# Patient Record
Sex: FEMALE | Race: WHITE | NOT HISPANIC OR LATINO | Employment: OTHER | URBAN - METROPOLITAN AREA
[De-identification: names, ages, dates, MRNs, and addresses within clinical notes are randomized per-mention and may not be internally consistent; named-entity substitution may affect disease eponyms.]

---

## 2017-01-04 ENCOUNTER — PATIENT MESSAGE (OUTPATIENT)
Dept: TRANSPLANT | Facility: CLINIC | Age: 72
End: 2017-01-04

## 2017-01-16 DIAGNOSIS — R19.7 DIARRHEA: ICD-10-CM

## 2017-01-17 RX ORDER — LOPERAMIDE HYDROCHLORIDE 2 MG/1
CAPSULE ORAL
Qty: 30 CAPSULE | Refills: 0 | Status: SHIPPED | OUTPATIENT
Start: 2017-01-17 | End: 2017-02-07 | Stop reason: SDUPTHER

## 2017-01-23 LAB
EXT ALBUMIN: 3.2
EXT ALKALINE PHOSPHATASE: 101
EXT ALT: 27
EXT AST: 25
EXT BILIRUBIN TOTAL: 0.6
EXT BUN: 13
EXT CALCIUM: 8.6
EXT CHLORIDE: 106
EXT CO2: 31
EXT CREATININE: 0.7 MG/DL
EXT CYCLOSPORONE LEVEL: 135
EXT EOSINOPHIL%: 3.6
EXT GFR MDRD NON AF AMER: 94
EXT GLUCOSE: 93
EXT HEMATOCRIT: 39.1
EXT HEMOGLOBIN: 12.3
EXT LYMPH%: 15.8
EXT MONOCYTES%: 7.8
EXT PLATELETS: 159
EXT POTASSIUM: 4.1
EXT PROTEIN TOTAL: 6.3
EXT SEGS%: 72.4
EXT SODIUM: 144 MMOL/L
EXT WBC: 10.2

## 2017-01-24 ENCOUNTER — TELEPHONE (OUTPATIENT)
Dept: TRANSPLANT | Facility: CLINIC | Age: 72
End: 2017-01-24

## 2017-01-24 NOTE — TELEPHONE ENCOUNTER
----- Message from Shala Hernandez MD sent at 1/24/2017  1:16 PM CST -----  The Labs are stable - please let patient know.

## 2017-02-06 PROBLEM — I81 PORTAL VEIN THROMBOSIS: Status: ACTIVE | Noted: 2017-02-06

## 2017-02-07 DIAGNOSIS — R19.7 DIARRHEA: ICD-10-CM

## 2017-02-08 RX ORDER — LOPERAMIDE HYDROCHLORIDE 2 MG/1
CAPSULE ORAL
Qty: 30 CAPSULE | Refills: 0 | Status: SHIPPED | OUTPATIENT
Start: 2017-02-08 | End: 2017-03-03 | Stop reason: SDUPTHER

## 2017-03-03 DIAGNOSIS — R19.7 DIARRHEA: ICD-10-CM

## 2017-03-03 RX ORDER — LOPERAMIDE HYDROCHLORIDE 2 MG/1
CAPSULE ORAL
Qty: 30 CAPSULE | Refills: 0 | Status: SHIPPED | OUTPATIENT
Start: 2017-03-03 | End: 2017-03-23 | Stop reason: SDUPTHER

## 2017-03-05 DIAGNOSIS — K21.9 GASTROESOPHAGEAL REFLUX DISEASE WITHOUT ESOPHAGITIS: ICD-10-CM

## 2017-03-05 RX ORDER — FAMOTIDINE 40 MG/1
TABLET, FILM COATED ORAL
Qty: 30 TABLET | Refills: 11 | Status: SHIPPED | OUTPATIENT
Start: 2017-03-05 | End: 2017-08-29 | Stop reason: SDUPTHER

## 2017-03-23 DIAGNOSIS — R19.7 DIARRHEA, UNSPECIFIED TYPE: ICD-10-CM

## 2017-03-23 RX ORDER — LOPERAMIDE HYDROCHLORIDE 2 MG/1
2 CAPSULE ORAL 4 TIMES DAILY PRN
Qty: 90 CAPSULE | Refills: 0 | Status: SHIPPED | OUTPATIENT
Start: 2017-03-23 | End: 2017-05-12 | Stop reason: SDUPTHER

## 2017-03-24 ENCOUNTER — TELEPHONE (OUTPATIENT)
Dept: TRANSPLANT | Facility: CLINIC | Age: 72
End: 2017-03-24

## 2017-03-24 NOTE — TELEPHONE ENCOUNTER
"Spoke to patient's  "Zachariah", he reported that Pharmacy called to say Rx. For Immodium was ready. Informed him that  gave more capsules.   "

## 2017-03-24 NOTE — TELEPHONE ENCOUNTER
----- Message from Cheryle Palmer sent at 3/23/2017  3:06 PM CDT -----  Contact: pt    Calling to speak with you about a prescription loperamide (IMODIUM) 2 mg to get an increase on the supply . Please call

## 2017-04-05 ENCOUNTER — TELEPHONE (OUTPATIENT)
Dept: TRANSPLANT | Facility: CLINIC | Age: 72
End: 2017-04-05

## 2017-04-05 DIAGNOSIS — Z94.0 KIDNEY REPLACED BY TRANSPLANT: ICD-10-CM

## 2017-04-05 DIAGNOSIS — Z94.4 LIVER REPLACED BY TRANSPLANT: Primary | ICD-10-CM

## 2017-04-05 DIAGNOSIS — E55.9 VITAMIN D DEFICIENCY: ICD-10-CM

## 2017-04-05 NOTE — TELEPHONE ENCOUNTER
Patient due for RTC appt. With Dr. Hernandez for 6 mo. F/u in May 2017 (per last clinic note).  Appointment cards for RTC and labs given to .

## 2017-05-12 DIAGNOSIS — R19.7 DIARRHEA, UNSPECIFIED TYPE: ICD-10-CM

## 2017-05-14 RX ORDER — LOPERAMIDE HYDROCHLORIDE 2 MG/1
2 CAPSULE ORAL 4 TIMES DAILY PRN
Qty: 90 CAPSULE | Refills: 0 | Status: SHIPPED | OUTPATIENT
Start: 2017-05-14

## 2017-05-29 ENCOUNTER — OFFICE VISIT (OUTPATIENT)
Dept: TRANSPLANT | Facility: CLINIC | Age: 72
End: 2017-05-29
Payer: MEDICARE

## 2017-05-29 ENCOUNTER — LAB VISIT (OUTPATIENT)
Dept: LAB | Facility: HOSPITAL | Age: 72
End: 2017-05-29
Attending: INTERNAL MEDICINE
Payer: MEDICARE

## 2017-05-29 VITALS
BODY MASS INDEX: 31.59 KG/M2 | HEART RATE: 74 BPM | OXYGEN SATURATION: 98 % | SYSTOLIC BLOOD PRESSURE: 142 MMHG | HEIGHT: 65 IN | DIASTOLIC BLOOD PRESSURE: 64 MMHG | TEMPERATURE: 88 F | RESPIRATION RATE: 16 BRPM | WEIGHT: 189.63 LBS

## 2017-05-29 DIAGNOSIS — E55.9 VITAMIN D DEFICIENCY: ICD-10-CM

## 2017-05-29 DIAGNOSIS — D84.9 IMMUNOSUPPRESSED STATUS: ICD-10-CM

## 2017-05-29 DIAGNOSIS — Z94.4 S/P LIVER TRANSPLANT: Chronic | ICD-10-CM

## 2017-05-29 DIAGNOSIS — Z94.0 KIDNEY REPLACED BY TRANSPLANT: ICD-10-CM

## 2017-05-29 DIAGNOSIS — Z29.89 NEED FOR PROPHYLACTIC IMMUNOTHERAPY: Primary | ICD-10-CM

## 2017-05-29 DIAGNOSIS — Z94.4 LIVER REPLACED BY TRANSPLANT: ICD-10-CM

## 2017-05-29 LAB
25(OH)D3+25(OH)D2 SERPL-MCNC: 32 NG/ML
ALBUMIN SERPL BCP-MCNC: 3.2 G/DL
ALP SERPL-CCNC: 114 U/L
ALT SERPL W/O P-5'-P-CCNC: 16 U/L
ANION GAP SERPL CALC-SCNC: 10 MMOL/L
AST SERPL-CCNC: 22 U/L
BASOPHILS # BLD AUTO: 0.01 K/UL
BASOPHILS NFR BLD: 0.1 %
BILIRUB SERPL-MCNC: 0.4 MG/DL
BUN SERPL-MCNC: 19 MG/DL
CALCIUM SERPL-MCNC: 9.3 MG/DL
CHLORIDE SERPL-SCNC: 106 MMOL/L
CO2 SERPL-SCNC: 27 MMOL/L
CREAT SERPL-MCNC: 0.8 MG/DL
CYCLOSPORINE BLD LC/MS/MS-MCNC: 138 NG/ML
DIFFERENTIAL METHOD: ABNORMAL
EOSINOPHIL # BLD AUTO: 0.5 K/UL
EOSINOPHIL NFR BLD: 4.3 %
ERYTHROCYTE [DISTWIDTH] IN BLOOD BY AUTOMATED COUNT: 14.3 %
EST. GFR  (AFRICAN AMERICAN): >60 ML/MIN/1.73 M^2
EST. GFR  (NON AFRICAN AMERICAN): >60 ML/MIN/1.73 M^2
GLUCOSE SERPL-MCNC: 113 MG/DL
HCT VFR BLD AUTO: 41 %
HGB BLD-MCNC: 13.1 G/DL
LYMPHOCYTES # BLD AUTO: 1.9 K/UL
LYMPHOCYTES NFR BLD: 15.7 %
MAGNESIUM SERPL-MCNC: 1.5 MG/DL
MCH RBC QN AUTO: 27.1 PG
MCHC RBC AUTO-ENTMCNC: 32 %
MCV RBC AUTO: 85 FL
MONOCYTES # BLD AUTO: 1 K/UL
MONOCYTES NFR BLD: 8.1 %
NEUTROPHILS # BLD AUTO: 8.7 K/UL
NEUTROPHILS NFR BLD: 71.1 %
PHOSPHATE SERPL-MCNC: 3.7 MG/DL
PLATELET # BLD AUTO: 201 K/UL
PMV BLD AUTO: 12.4 FL
POTASSIUM SERPL-SCNC: 4.6 MMOL/L
PROT SERPL-MCNC: 6.4 G/DL
PTH-INTACT SERPL-MCNC: 142 PG/ML
RBC # BLD AUTO: 4.83 M/UL
SODIUM SERPL-SCNC: 143 MMOL/L
WBC # BLD AUTO: 12.18 K/UL

## 2017-05-29 PROCEDURE — 99999 PR PBB SHADOW E&M-EST. PATIENT-LVL IV: CPT | Mod: PBBFAC,,, | Performed by: INTERNAL MEDICINE

## 2017-05-29 PROCEDURE — 99214 OFFICE O/P EST MOD 30 MIN: CPT | Mod: PBBFAC | Performed by: INTERNAL MEDICINE

## 2017-05-29 PROCEDURE — 99215 OFFICE O/P EST HI 40 MIN: CPT | Mod: S$PBB,,, | Performed by: INTERNAL MEDICINE

## 2017-05-29 RX ORDER — MONTELUKAST SODIUM 4 MG/1
1 TABLET, CHEWABLE ORAL DAILY
Refills: 1 | COMMUNITY
Start: 2017-04-18

## 2017-05-29 NOTE — LETTER
June 1, 2017        Julian Santo  [address] [facility] [address]  Phone: [phone]  Fax: [phone]             [facility] - Liver Transplant  [address]  [facility] [address]  Phone: [phone]   Patient: Lillian Stout   MR Number: [id number]   YOB: 1945   Date of Visit: 5/29/2017       Dear Dr. Julian Santo    Thank you for referring Lillian Stout to me for evaluation. Attached you will find relevant portions of my assessment and plan of care.    If you have questions, please do not hesitate to call me. I look forward to following Lillian Stout along with you.    Sincerely,    Shala Hernandez MD    Enclosure    If you would like to receive this communication electronically, please contact [email or web address] or [phone] to request [facility] Link access.    [facility] is a tool which provides read-only access to select patient information with whom you have a relationship. Its easy to use and provides real time access to review your patients record including encounter summaries, notes, results, and demographic information.    If you feel you have received this communication in error or would no longer like to receive these types of communications, please e-mail [email or web address]

## 2017-05-29 NOTE — PROGRESS NOTES
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    ORGAN: LIVER  Whole or Partial: whole liver  Donor Type:  - brain death  [facility] High Risk: no  Donor CMV Status: positive  Donor HCV Status: negative  Donor HBcAb: negative  Biliary Anastomosis: end to end  Arterial Anatomy: standard  IVC reconstruction: end to end ivc  Portal vein status: completely thrombosed    She had a long complicted course post liver-kidney transplant.     Subjective:     Interval History: Lillian is now 30 months post liver-kidney transplant. She is overall doing well.    She is still on CSA and prednisone (not cellcept). Liver enzymes are normal. She had biliary stents removed in  and they were not replaced. Her last u/s with doppler was in Oct/15 and was normal. Liver tests are low. She had her IVC filter removed on 2/3/16.    She is c/o fatigue. I note she is on metoprolol     She saw a dermatologist in Aug/16.  She had a normal mammogram ni  and normal pap test at that time.Bone density was done and showed osteoporosis. She has a Rx for alendronate.    Review of Systems   Constitutional: Negative.    HENT: Negative.    Eyes: Negative.    Respiratory: Negative.    Cardiovascular: Negative.    Gastrointestinal: Negative.    Genitourinary: Negative.    Musculoskeletal:        Lower Leg pain   Skin: Negative.    Neurological: Negative.    Psychiatric/Behavioral: Negative.        Objective:     Physical Exam   Constitutional: She is oriented to person, place, and time. She appears well-developed and well-nourished.   HENT:   Head: Normocephalic and atraumatic.   Eyes: Conjunctivae and EOM are normal. Pupils are equal, round, and reactive to light. No scleral icterus.   Neck: Normal range of motion. Neck supple. No thyromegaly present.    Cardiovascular: Normal rate, regular rhythm and normal heart sounds.    Pulmonary/Chest: Effort normal and breath sounds normal. She has no rales.   Abdominal: Soft. Bowel sounds are normal. She exhibits no distension and no mass. There is no tenderness.   Musculoskeletal: Normal range of motion. She exhibits no edema.   Neurological: She is alert and oriented to person, place, and time.   Skin: Skin is warm and dry. No rash noted.   Psychiatric: She has a normal mood and affect.   Vitals reviewed.    Lab Results   Component Value Date    BILITOT 0.4 01/25/2016    AST 14 01/25/2016    ALT 15 01/25/2016    ALKPHOS 184 (H) 01/25/2016    CREATININE 0.8 01/25/2016    ALBUMIN 3.2 (L) 01/25/2016     Lab Results   Component Value Date    WBC 12.18 05/29/2017    HGB 13.1 05/29/2017    HCT 41.0 05/29/2017    HCT 22 (L) 12/09/2014     05/29/2017     Lab Results   Component Value Date    TACROLIMUS <1.5 (L) 07/25/2015    CYCLOSPORINE 124 01/25/2016       Assessment/Plan:     The patient is now 30 months s/p liver- kidney transplant and has excellent allograft function. Current recommendations:  1. Complication of liver tx; biliary stricture: stents out: monitor for recurrence  2. S/p liver-kidney tx and control of IS: continue csa and prednisone; continue to hold cellcept (target trough )  3. DM, continue insulin  4. Malnutrition, improved: monitor  5. Zoster: no recurrence; monitor  6. Fatigue: consider change to alternate antihypertensive agent; metoprolol may be causing fatigue; consider sleep study; check tsh  7. Diarrhea with incontinence. Continue metamucil and imodium.  9. Lasix: edema, ongoing: continue lasix  10. Na chloride: continue  11.  Health maintenance: the patient should see a dermatologist annually to screen for skin cancer, perform regular colonoscopies, pap tests and mammograms  12. R/O osteoporosis.I agree with alendronate; repeat bone density 2 years  Return at 3 years post liver  tx.    Shala Hernandez MD           [facility] Patient Status  Functional Status: 50% - Requires considerable assistance and frequent medical care  Physical Capacity: Limited Mobility  . . . . .

## 2017-05-29 NOTE — PATIENT INSTRUCTIONS
1. metoprolol may be causing fatigue- ask PCP to change it  2. Check TSH and lipids with PCP  3. Consider sleep study for fatigue  Return at 3 years post liver transplant

## 2017-06-06 NOTE — PROGRESS NOTES
Labs and imaging studies were reviewed with the following changes  No change at the moment  Will defer any further follow up with the patient's General nephrologist. Let us know if there is any change with GFR  thanks

## 2017-06-07 ENCOUNTER — TELEPHONE (OUTPATIENT)
Dept: TRANSPLANT | Facility: CLINIC | Age: 72
End: 2017-06-07

## 2017-06-07 NOTE — TELEPHONE ENCOUNTER
----- Message from Rick Montalvo MD sent at 6/6/2017 11:29 AM CDT -----  Labs and imaging studies were reviewed with the following changes  No change at the moment  Will defer any further follow up with the patient's General nephrologist. Let us know if there is any change with GFR  thanks

## 2017-06-08 LAB
BK VIRUS DNA PCR, QUANT, BLOOD: NORMAL
BK VIRUS DNA, BLOOD: NORMAL
LOG BKV COPIES/ML: NORMAL

## 2017-07-08 ENCOUNTER — PATIENT MESSAGE (OUTPATIENT)
Dept: TRANSPLANT | Facility: CLINIC | Age: 72
End: 2017-07-08

## 2017-08-04 ENCOUNTER — TELEPHONE (OUTPATIENT)
Dept: TRANSPLANT | Facility: CLINIC | Age: 72
End: 2017-08-04

## 2017-08-04 NOTE — TELEPHONE ENCOUNTER
Spoke to nurse at [facility] ('s office) re: lab results of 7/31/17. Informed that patient did not get labs drawn that date.  Last labs in June 2017.  Missed Lab Letter was sent to patient.

## 2017-08-10 ENCOUNTER — TELEPHONE (OUTPATIENT)
Dept: TRANSPLANT | Facility: CLINIC | Age: 72
End: 2017-08-10

## 2017-08-10 NOTE — TELEPHONE ENCOUNTER
Received call from patient's : she will start having her labs drawn at [facility] in [place].  She will go on Monday 8/14/17.    Standing Lab Order faxed to lab at [facility] (fax # [phone]).

## 2017-08-14 LAB
EXT ALBUMIN: 4
EXT ALKALINE PHOSPHATASE: 92
EXT ALT: 21
EXT AST: 32
EXT BILIRUBIN TOTAL: 0.4
EXT BUN: 23
EXT CALCIUM: 9.9
EXT CHLORIDE: 104
EXT CO2: 28
EXT CREATININE: 0.9 MG/DL
EXT CYCLOSPORONE LEVEL: 158
EXT EOSINOPHIL%: 6.1
EXT GLUCOSE: 88
EXT HEMATOCRIT: 40.1
EXT HEMOGLOBIN: 12.4
EXT LYMPH%: 11.7
EXT MONOCYTES%: 7.8
EXT PLATELETS: 172
EXT POTASSIUM: 5.2
EXT PROTEIN TOTAL: 6.2
EXT SEGS%: 73.9
EXT SODIUM: 146 MMOL/L
EXT WBC: 10.8

## 2017-08-18 ENCOUNTER — TELEPHONE (OUTPATIENT)
Dept: TRANSPLANT | Facility: CLINIC | Age: 72
End: 2017-08-18

## 2017-08-18 DIAGNOSIS — R60.9 EDEMA, UNSPECIFIED TYPE: ICD-10-CM

## 2017-08-18 RX ORDER — FUROSEMIDE 20 MG/1
20 TABLET ORAL DAILY
Qty: 30 TABLET | Refills: 11 | Status: SHIPPED | OUTPATIENT
Start: 2017-08-18 | End: 2017-08-29 | Stop reason: SDUPTHER

## 2017-08-21 DIAGNOSIS — Z94.4 STATUS POST LIVER TRANSPLANT: ICD-10-CM

## 2017-08-21 RX ORDER — PREDNISONE 5 MG/1
5 TABLET ORAL DAILY
Qty: 30 TABLET | Refills: 11 | Status: SHIPPED | OUTPATIENT
Start: 2017-08-21 | End: 2017-08-29 | Stop reason: SDUPTHER

## 2017-08-29 DIAGNOSIS — K21.9 GASTROESOPHAGEAL REFLUX DISEASE WITHOUT ESOPHAGITIS: ICD-10-CM

## 2017-08-29 DIAGNOSIS — Z94.4 STATUS POST LIVER TRANSPLANT: ICD-10-CM

## 2017-08-29 DIAGNOSIS — R60.9 EDEMA, UNSPECIFIED TYPE: ICD-10-CM

## 2017-08-29 RX ORDER — FUROSEMIDE 20 MG/1
20 TABLET ORAL DAILY
Qty: 90 TABLET | Refills: 3 | Status: SHIPPED | OUTPATIENT
Start: 2017-08-29 | End: 2018-10-21 | Stop reason: SDUPTHER

## 2017-08-29 RX ORDER — PREDNISONE 5 MG/1
5 TABLET ORAL DAILY
Qty: 90 TABLET | Refills: 3 | Status: SHIPPED | OUTPATIENT
Start: 2017-08-29 | End: 2018-10-21 | Stop reason: SDUPTHER

## 2017-08-29 RX ORDER — FAMOTIDINE 40 MG/1
40 TABLET, FILM COATED ORAL DAILY
Qty: 90 TABLET | Refills: 3 | Status: SHIPPED | OUTPATIENT
Start: 2017-08-29 | End: 2018-11-17 | Stop reason: SDUPTHER

## 2017-10-06 ENCOUNTER — TELEPHONE (OUTPATIENT)
Dept: TRANSPLANT | Facility: CLINIC | Age: 72
End: 2017-10-06

## 2017-10-06 NOTE — TELEPHONE ENCOUNTER
----- Message from Cheryle Palmer sent at 10/6/2017  1:51 PM CDT -----  Contact: patient   Patient calling to see if the patient needs labs before her appt.       Please call       Thanks!!

## 2017-10-06 NOTE — TELEPHONE ENCOUNTER
Returned call to pt's . He would like to get labs the same day as her appt with Dr. Hernandez. Card submitted.

## 2017-10-09 DIAGNOSIS — Z94.4 LIVER TRANSPLANTED: Primary | ICD-10-CM

## 2017-10-10 DIAGNOSIS — Z94.4 STATUS POST LIVER TRANSPLANT: ICD-10-CM

## 2017-10-10 RX ORDER — CYCLOSPORINE 25 MG/1
CAPSULE, LIQUID FILLED ORAL
Qty: 90 CAPSULE | Refills: 3 | Status: SHIPPED | OUTPATIENT
Start: 2017-10-10 | End: 2018-11-01 | Stop reason: SDUPTHER

## 2017-10-10 RX ORDER — CYCLOSPORINE 100 MG/1
CAPSULE, LIQUID FILLED ORAL
Qty: 180 CAPSULE | Refills: 3 | Status: SHIPPED | OUTPATIENT
Start: 2017-10-10 | End: 2018-11-01 | Stop reason: SDUPTHER

## 2017-10-11 ENCOUNTER — PATIENT MESSAGE (OUTPATIENT)
Dept: TRANSPLANT | Facility: CLINIC | Age: 72
End: 2017-10-11

## 2017-10-11 NOTE — TELEPHONE ENCOUNTER
Called [facility] and prescriptions for both cyclosporines are being processed.

## 2017-11-13 ENCOUNTER — LAB VISIT (OUTPATIENT)
Dept: LAB | Facility: HOSPITAL | Age: 72
End: 2017-11-13
Attending: INTERNAL MEDICINE
Payer: MEDICARE

## 2017-11-13 ENCOUNTER — OFFICE VISIT (OUTPATIENT)
Dept: TRANSPLANT | Facility: CLINIC | Age: 72
End: 2017-11-13
Payer: MEDICARE

## 2017-11-13 VITALS
WEIGHT: 198.44 LBS | BODY MASS INDEX: 33.06 KG/M2 | OXYGEN SATURATION: 99 % | RESPIRATION RATE: 18 BRPM | SYSTOLIC BLOOD PRESSURE: 160 MMHG | HEIGHT: 65 IN | TEMPERATURE: 98 F | DIASTOLIC BLOOD PRESSURE: 80 MMHG | HEART RATE: 84 BPM

## 2017-11-13 DIAGNOSIS — Z94.4 LIVER TRANSPLANTED: ICD-10-CM

## 2017-11-13 DIAGNOSIS — Z29.89 NEED FOR PROPHYLACTIC IMMUNOTHERAPY: ICD-10-CM

## 2017-11-13 DIAGNOSIS — Z94.0 S/P KIDNEY TRANSPLANT: Chronic | ICD-10-CM

## 2017-11-13 DIAGNOSIS — M81.0 AGE-RELATED OSTEOPOROSIS WITHOUT CURRENT PATHOLOGICAL FRACTURE: Primary | ICD-10-CM

## 2017-11-13 DIAGNOSIS — Z94.4 S/P LIVER TRANSPLANT: Chronic | ICD-10-CM

## 2017-11-13 LAB
ALBUMIN SERPL BCP-MCNC: 3.1 G/DL
ALP SERPL-CCNC: 114 U/L
ALT SERPL W/O P-5'-P-CCNC: 21 U/L
ANION GAP SERPL CALC-SCNC: 11 MMOL/L
AST SERPL-CCNC: 28 U/L
BASOPHILS # BLD AUTO: 0.05 K/UL
BASOPHILS NFR BLD: 0.3 %
BILIRUB SERPL-MCNC: 0.6 MG/DL
BUN SERPL-MCNC: 22 MG/DL
CALCIUM SERPL-MCNC: 9.9 MG/DL
CHLORIDE SERPL-SCNC: 104 MMOL/L
CO2 SERPL-SCNC: 27 MMOL/L
CREAT SERPL-MCNC: 0.9 MG/DL
CYCLOSPORINE BLD LC/MS/MS-MCNC: 337 NG/ML
DIFFERENTIAL METHOD: ABNORMAL
EOSINOPHIL # BLD AUTO: 0.5 K/UL
EOSINOPHIL NFR BLD: 3.1 %
ERYTHROCYTE [DISTWIDTH] IN BLOOD BY AUTOMATED COUNT: 14.4 %
EST. GFR  (AFRICAN AMERICAN): >60 ML/MIN/1.73 M^2
EST. GFR  (NON AFRICAN AMERICAN): >60 ML/MIN/1.73 M^2
GLUCOSE SERPL-MCNC: 137 MG/DL
HCT VFR BLD AUTO: 41.1 %
HGB BLD-MCNC: 13.1 G/DL
IMM GRANULOCYTES # BLD AUTO: 0.09 K/UL
IMM GRANULOCYTES NFR BLD AUTO: 0.6 %
LYMPHOCYTES # BLD AUTO: 2.7 K/UL
LYMPHOCYTES NFR BLD: 17.9 %
MAGNESIUM SERPL-MCNC: 1.5 MG/DL
MCH RBC QN AUTO: 27.1 PG
MCHC RBC AUTO-ENTMCNC: 31.9 G/DL
MCV RBC AUTO: 85 FL
MONOCYTES # BLD AUTO: 0.8 K/UL
MONOCYTES NFR BLD: 5.3 %
NEUTROPHILS # BLD AUTO: 10.9 K/UL
NEUTROPHILS NFR BLD: 72.8 %
NRBC BLD-RTO: 0 /100 WBC
PHOSPHATE SERPL-MCNC: 3.4 MG/DL
PLATELET # BLD AUTO: 176 K/UL
PMV BLD AUTO: 14.2 FL
POTASSIUM SERPL-SCNC: 4.3 MMOL/L
PROT SERPL-MCNC: 6.9 G/DL
RBC # BLD AUTO: 4.83 M/UL
SODIUM SERPL-SCNC: 142 MMOL/L
WBC # BLD AUTO: 14.98 K/UL

## 2017-11-13 PROCEDURE — 99215 OFFICE O/P EST HI 40 MIN: CPT | Mod: S$PBB,,, | Performed by: INTERNAL MEDICINE

## 2017-11-13 PROCEDURE — 36415 COLL VENOUS BLD VENIPUNCTURE: CPT

## 2017-11-13 PROCEDURE — 99999 PR PBB SHADOW E&M-EST. PATIENT-LVL IV: CPT | Mod: PBBFAC,,, | Performed by: INTERNAL MEDICINE

## 2017-11-13 PROCEDURE — 85025 COMPLETE CBC W/AUTO DIFF WBC: CPT

## 2017-11-13 PROCEDURE — 99214 OFFICE O/P EST MOD 30 MIN: CPT | Mod: PBBFAC | Performed by: INTERNAL MEDICINE

## 2017-11-13 PROCEDURE — 84100 ASSAY OF PHOSPHORUS: CPT

## 2017-11-13 PROCEDURE — 83735 ASSAY OF MAGNESIUM: CPT

## 2017-11-13 PROCEDURE — 80053 COMPREHEN METABOLIC PANEL: CPT

## 2017-11-13 PROCEDURE — 80158 DRUG ASSAY CYCLOSPORINE: CPT

## 2017-11-13 RX ORDER — NAPROXEN SODIUM 220 MG/1
81 TABLET, FILM COATED ORAL DAILY
COMMUNITY

## 2017-11-13 NOTE — PROGRESS NOTES
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    ORGAN: LIVER  Whole or Partial: whole liver  Donor Type:  - brain death  [facility] High Risk: no  Donor CMV Status: positive  Donor HCV Status: negative  Donor HBcAb: negative  Biliary Anastomosis: end to end  Arterial Anatomy: standard  IVC reconstruction: end to end ivc  Portal vein status: completely thrombosed    She had a long complicted course post liver-kidney transplant.     Subjective:     Interval History: Lillian is now almost 3 years post liver-kidney transplant. She is overall doing well but has become weaker.    She is still on CSA and prednisone (not cellcept). Liver enzymes are normal. She had biliary stents removed in  and they were not replaced. Her last u/s with doppler was in Oct/15 and was normal. Liver tests are low. She had her IVC filter removed on 2/3/16.    She is c/o fatigue and is due to have a sleep study in the near future.    She sees a dermatologist annually.  She also has regular mammograms and pap tests. Bone density was done in 2016 and showed osteoporosis. She has a Rx for alendronate. She has colonoscopies done locally.    Review of Systems   Constitutional: Negative.    HENT: Negative.    Eyes: Negative.    Respiratory: Negative.    Cardiovascular: Negative.    Gastrointestinal: Negative.    Genitourinary: Negative.    Musculoskeletal:        Lower Leg pain   Skin: Negative.    Neurological: Negative.    Psychiatric/Behavioral: Negative.        Objective:     Physical Exam   Constitutional: She is oriented to person, place, and time. She appears well-developed and well-nourished.   HENT:   Head: Normocephalic and atraumatic.   Eyes: Conjunctivae and EOM are normal. Pupils are equal, round, and reactive to light. No scleral icterus.   Neck:  Normal range of motion. Neck supple. No thyromegaly present.   Cardiovascular: Normal rate, regular rhythm and normal heart sounds.    Pulmonary/Chest: Effort normal and breath sounds normal. She has no rales.   Abdominal: Soft. Bowel sounds are normal. She exhibits no distension and no mass. There is no tenderness.   Musculoskeletal: Normal range of motion. She exhibits no edema.   Neurological: She is alert and oriented to person, place, and time.   Skin: Skin is warm and dry. No rash noted.   Psychiatric: She has a normal mood and affect.   Vitals reviewed.    Lab Results   Component Value Date    BILITOT 0.6 11/13/2017    AST 28 11/13/2017    ALT 21 11/13/2017    ALKPHOS 114 11/13/2017    CREATININE 0.9 11/13/2017    ALBUMIN 3.1 (L) 11/13/2017     Lab Results   Component Value Date    WBC 14.98 (H) 11/13/2017    HGB 13.1 11/13/2017    HCT 41.1 11/13/2017    HCT 22 (L) 12/09/2014     11/13/2017     Lab Results   Component Value Date    TACROLIMUS <1.5 (L) 07/25/2015    CYCLOSPORINE 138 05/29/2017       Assessment/Plan:     The patient is now almost 3 years s/p liver- kidney transplant and has excellent allograft function. Current recommendations:  1. Complication of liver tx; biliary stricture: stents out: monitor for recurrence  2. S/p liver-kidney tx and control of IS: continue csa and prednisone; continue to hold cellcept (target trough )  3. DM, continue insulin  4. Malnutrition, improved: monitor  5. Zoster: no recurrence; monitor  6. Fatigue: consider change to alternate antihypertensive agent; metoprolol may be causing fatigue; agree with sleep study.  7. Diarrhea with incontinence.  Stable - metamucil and imodium prn  9. Lasix: edema, ongoing: continue lasix  10. Na chloride: continue  11.  Health maintenance: the patient should see a dermatologist annually to screen for skin cancer, perform regular colonoscopies, pap tests and mammograms  12. R/O osteoporosis.I agree with alendronate; repeat  bone densityin 2018  Return at 4 years post liver tx.    Shala Hernandez MD           [facility] Patient Status  Functional Status: 50% - Requires considerable assistance and frequent medical care  Physical Capacity: Limited Mobility  . . . . . .

## 2017-11-13 NOTE — LETTER
November 17, 2017        Julian Santo  [address] [facility] [address]  Phone: [phone]  Fax: [phone]             [facility] - Liver Transplant  [address]  [facility] [address]  Phone: [phone]   Patient: Lillian Stout   MR Number: [id number]   YOB: 1945   Date of Visit: 11/13/2017       Dear Dr. Julian Santo    Thank you for referring Lillian Stout to me for evaluation. Attached you will find relevant portions of my assessment and plan of care.    If you have questions, please do not hesitate to call me. I look forward to following Lillian Stout along with you.    Sincerely,    Shala Hernandez MD    Enclosure    If you would like to receive this communication electronically, please contact [email or web address] or [phone] to request [facility] Link access.    [facility] Link is a tool which provides read-only access to select patient information with whom you have a relationship. Its easy to use and provides real time access to review your patients record including encounter summaries, notes, results, and demographic information.    If you feel you have received this communication in error or would no longer like to receive these types of communications, please e-mail [email or web address]

## 2017-11-13 NOTE — PATIENT INSTRUCTIONS
1. Perform regular mammograms and pap tests according to your gynecologist  2. Repeat bone density 2018  3. Colonoscopy-follow up with local GI to find out when next one is due  4. Sleep study coming up- I agree with this  5. Continue to see dermatologist once a year  Return one year

## 2017-11-20 ENCOUNTER — TELEPHONE (OUTPATIENT)
Dept: TRANSPLANT | Facility: CLINIC | Age: 72
End: 2017-11-20

## 2017-11-20 NOTE — TELEPHONE ENCOUNTER
reviewed labs and elevated cyclo level. He stated he did not think wife took med prior to labs. We will recheck tomorrow morning. Orders faxed to [facility].

## 2017-11-20 NOTE — TELEPHONE ENCOUNTER
----- Message from Shala Hernandez MD sent at 11/20/2017 12:54 AM CST -----  Repeat csa level- please let patient know.

## 2017-11-24 ENCOUNTER — TELEPHONE (OUTPATIENT)
Dept: TRANSPLANT | Facility: CLINIC | Age: 72
End: 2017-11-24

## 2017-11-24 NOTE — TELEPHONE ENCOUNTER
Pending next appt with dr ramos in 11/2018. hill called and talked with pt's  in regards to pt's elevated cyclo level. hill had asked that pt repeat levels and  stated that he understood. Have received no labs at this time. Left another  asking for a return call from pt so that we can schedule labs.

## 2017-11-27 ENCOUNTER — TELEPHONE (OUTPATIENT)
Dept: TRANSPLANT | Facility: CLINIC | Age: 72
End: 2017-11-27

## 2017-11-27 ENCOUNTER — PATIENT MESSAGE (OUTPATIENT)
Dept: TRANSPLANT | Facility: CLINIC | Age: 72
End: 2017-11-27

## 2017-11-27 NOTE — TELEPHONE ENCOUNTER
Talked with pt's  last week about the need to recheck pt's labs d/t elevated cyclosporine level. He stated he understood. coord has not received any lab results from [facility]. Attempted to call pt and got a VM. Left ms for her to call us back to reschedule the labs asap.

## 2017-11-28 LAB
EXT ALBUMIN: 3.7
EXT ALKALINE PHOSPHATASE: 83
EXT ALT: 27
EXT AST: 30
EXT BILIRUBIN TOTAL: 0.4
EXT BUN: 17
EXT CALCIUM: 9.8
EXT CHLORIDE: 102
EXT CO2: 28
EXT CREATININE: 0.82 MG/DL
EXT CYCLOSPORONE LEVEL: 217
EXT GLUCOSE: 123
EXT HEMATOCRIT: 40.6
EXT HEMOGLOBIN: 12.6
EXT PLATELETS: 146
EXT POTASSIUM: 5.2
EXT PROTEIN TOTAL: 6
EXT SODIUM: 141 MMOL/L
EXT WBC: 8.2

## 2017-12-01 ENCOUNTER — TELEPHONE (OUTPATIENT)
Dept: TRANSPLANT | Facility: CLINIC | Age: 72
End: 2017-12-01

## 2017-12-01 NOTE — TELEPHONE ENCOUNTER
----- Message from Shala Hernandez MD sent at 11/30/2017 10:59 PM CST -----  The Labs are stable - please let patient know.

## 2017-12-11 ENCOUNTER — TELEPHONE (OUTPATIENT)
Dept: TRANSPLANT | Facility: CLINIC | Age: 72
End: 2017-12-11

## 2017-12-11 NOTE — TELEPHONE ENCOUNTER
----- Message from Theresa Rodrigez sent at 12/11/2017  2:05 PM CST -----  Contact: Pt -Zachariah  Pt calling to speak to someone about a medication a neurologist wants to prescribe to the pt but they need to know if she can take it.         Call back number: [phone]

## 2018-02-06 ENCOUNTER — PATIENT MESSAGE (OUTPATIENT)
Dept: TRANSPLANT | Facility: CLINIC | Age: 73
End: 2018-02-06

## 2018-02-20 ENCOUNTER — TELEPHONE (OUTPATIENT)
Dept: TRANSPLANT | Facility: CLINIC | Age: 73
End: 2018-02-20

## 2018-02-20 NOTE — TELEPHONE ENCOUNTER
Returned call to Patience. Verified that lab order was a one time lab order. WIll be faxing over a standing lab order.

## 2018-02-20 NOTE — TELEPHONE ENCOUNTER
----- Message from Theresa Rodrigez sent at 2/20/2018  3:20 PM CST -----  Contact: [facility]  Calling to get clarity on the orders that were sent over for the pt, she would like to know if it's supposed to be a standing order or one time order.       Call back number: [phone]

## 2018-02-22 LAB
APPEARANCE, UA: ABNORMAL
BILIRUB UR QL STRIP: ABNORMAL
COLOR UR: YELLOW
CREATININE RANDOM URINE: 182.2 MG/DL
CYCLOSPORINE: 155
EXT ALBUMIN: 3.8
EXT ALKALINE PHOSPHATASE: 84
EXT ALT: 24
EXT AST: 33
EXT BACTERIA UA: ABNORMAL
EXT BILIRUBIN TOTAL: 0.5
EXT BUN: 22
EXT CALCIUM: 9.9
EXT CHLORIDE: 106
EXT CO2: 27
EXT CREATININE: 0.92 MG/DL
EXT GLUCOSE: 115
EXT HEMATOCRIT: 39.3
EXT HEMOGLOBIN: 12.3
EXT MAGNESIUM: 1.5
EXT PHOSPHORUS: 4.3
EXT PLATELETS: 171
EXT POTASSIUM: 4.5
EXT PROTEIN TOTAL: 6
EXT RBC UA: ABNORMAL
EXT SODIUM: 144 MMOL/L
EXT WBC UA: ABNORMAL
EXT WBC: 11
GLUCOSE URINE: ABNORMAL
LEUKOCYTE ESTERASE UR QL STRIP: ABNORMAL
NITRITE UR QL STRIP: NEGATIVE
PH, URINE: 5
SP GR UR STRIP: 1.02 (ref 1–1.03)
SQUAMOUS EPITHELIAL, UA: ABNORMAL
URINE PROTEIN: 23

## 2018-02-26 ENCOUNTER — TELEPHONE (OUTPATIENT)
Dept: TRANSPLANT | Facility: CLINIC | Age: 73
End: 2018-02-26

## 2018-02-26 NOTE — TELEPHONE ENCOUNTER
----- Message from Shala Hernandez MD sent at 2/22/2018  3:16 PM CST -----  Needs csa level - please let patient know.

## 2018-02-26 NOTE — TELEPHONE ENCOUNTER
----- Message from Shala Hernandez MD sent at 2/25/2018  4:14 PM CST -----  The Labs are stable - please let patient know.

## 2018-02-26 NOTE — TELEPHONE ENCOUNTER
Labs reviewed and stable. Next labs will be due on 5/21/18. Msg sent to pt via my [facility] and a reminder letter will be mailed.

## 2018-02-26 NOTE — LETTER
February 26, 2018    Lillian Stout  [address] [facility] [address]          Dear Lillian Stout:  MRN: [id number]    Your lab results were stable.  There are no medicine changes.  Please have your labs drawn again on 5/21/18.      If you cannot have your labs drawn on the scheduled date, it is your responsibility to call the transplant department to reschedule.  To reschedule or make an appointment, please as to speak to or leave a message for my assistant, Lia Leo, at [phone].  When leaving a message for Felipa Fitzgerald, or myself, we ask that you leave a brief message regarding your request.    Sincerely,      Mame Serrano, RN, BSN  Your Liver Transplant Coordinator    [facility]  [facility] [place] [address] [phone]

## 2018-03-02 ENCOUNTER — TELEPHONE (OUTPATIENT)
Dept: TRANSPLANT | Facility: CLINIC | Age: 73
End: 2018-03-02

## 2018-03-02 NOTE — TELEPHONE ENCOUNTER
----- Message from Shala Hernandez MD sent at 3/1/2018  2:12 PM CST -----  The Labs are stable - please let patient know.

## 2018-05-04 ENCOUNTER — PATIENT MESSAGE (OUTPATIENT)
Dept: TRANSPLANT | Facility: CLINIC | Age: 73
End: 2018-05-04

## 2018-05-25 ENCOUNTER — TELEPHONE (OUTPATIENT)
Dept: TRANSPLANT | Facility: CLINIC | Age: 73
End: 2018-05-25

## 2018-05-28 LAB
EXT ALBUMIN: 3
EXT ALKALINE PHOSPHATASE: 96
EXT ALT: 21
EXT AST: 27
EXT BASOPHIL%: 0.3
EXT BILIRUBIN TOTAL: 0.3
EXT BUN: 26
EXT CALCIUM: 9.1
EXT CHLORIDE: 107
EXT CO2: 30
EXT CREATININE: 1.1 MG/DL
EXT CYCLOSPORONE LEVEL: 243
EXT EOSINOPHIL%: 3
EXT GLUCOSE: 78
EXT HEMATOCRIT: 37.8
EXT HEMOGLOBIN: 12
EXT LYMPH%: 18.6
EXT MONOCYTES%: 5.7
EXT PHOSPHORUS: 3.8
EXT PLATELETS: 164
EXT POTASSIUM: 4
EXT PROTEIN TOTAL: 6.3
EXT SEGS%: 72.4
EXT SODIUM: 146 MMOL/L
EXT WBC: 12.4

## 2018-05-30 ENCOUNTER — TELEPHONE (OUTPATIENT)
Dept: TRANSPLANT | Facility: CLINIC | Age: 73
End: 2018-05-30

## 2018-05-30 NOTE — TELEPHONE ENCOUNTER
Labs reviewed and stable. Next labs will be due on 8/27/18. Letter mailed to pt's  and next lab orders faxed to pt's [facility]

## 2018-05-30 NOTE — LETTER
May 30, 2018    Lillianquinton Stout  [address] [facility] [address]          Dear Lillian Stout:  MRN: [id number]    Your lab results were stable.  There are no medicine changes.  Please have your labs drawn again on 8/27/2018.  I faxed orders to [facility]. I have included a copy of orders for your record.    If you cannot have your labs drawn on the scheduled date, it is your responsibility to call the transplant department to reschedule.  To reschedule or make an appointment, please as to speak to or leave a message for my assistant, Lia Leo, at [phone].  When leaving a message for Felipa Fitzgerald, or myself, we ask that you leave a brief message regarding your request.    Sincerely,      Mame Serrano, RN, BSN  Your Liver Transplant Coordinator    [facility]  [facility] [place] [address]  [phone]

## 2018-05-30 NOTE — TELEPHONE ENCOUNTER
----- Message from Shala Hernandez MD sent at 5/28/2018  4:42 PM CDT -----  The Labs are stable - please let patient know.

## 2018-08-31 LAB
EXT ALBUMIN: 2.8
EXT ALKALINE PHOSPHATASE: 100
EXT ALT: 25
EXT AST: 32
EXT BASOPHIL%: 0.4
EXT BILIRUBIN TOTAL: 0.3
EXT BUN: 20
EXT CALCIUM: 8.9
EXT CHLORIDE: 107
EXT CO2: 30
EXT CREATININE: 0.9 MG/DL
EXT EOSINOPHIL%: 2.9
EXT GLUCOSE: 110
EXT HEMATOCRIT: 36.3
EXT HEMOGLOBIN: 12
EXT LYMPH%: 17.7
EXT MAGNESIUM: 1.5
EXT MONOCYTES%: 6.2
EXT PHOSPHORUS: 4.4
EXT PLATELETS: 166
EXT POTASSIUM: 4.6
EXT PROTEIN TOTAL: 5.8
EXT SEGS%: 22.8
EXT SODIUM: 144 MMOL/L
EXT WBC: 11.2

## 2018-09-07 ENCOUNTER — TELEPHONE (OUTPATIENT)
Dept: TRANSPLANT | Facility: CLINIC | Age: 73
End: 2018-09-07

## 2018-09-07 NOTE — TELEPHONE ENCOUNTER
Called [facility] to get the tacrolimus result from this week. Coord did receive the CBC and CMP already. Her nurse will fax over the result.

## 2018-09-10 ENCOUNTER — TELEPHONE (OUTPATIENT)
Dept: TRANSPLANT | Facility: CLINIC | Age: 73
End: 2018-09-10

## 2018-09-10 NOTE — TELEPHONE ENCOUNTER
Lbs reviewed and stable. Next labs due 11/26/18. Fax sent to [facility]  to let them know next lab date.

## 2018-09-10 NOTE — LETTER
September 10, 2018    Lillian Stout  [address] [facility] [place] [place] [address]          Dear Lillian Stout:  MRN: [id number]    Your lab results were stable.  There are no medicine changes.  Please have your labs drawn again on 11/26/2018.      If you cannot have your labs drawn on the scheduled date, it is your responsibility to call the transplant department to reschedule.  To reschedule or make an appointment, please as to speak to or leave a message for my assistant, Lia Leo, at [phone].  When leaving a message for Felipa Fitzgerald, or myself, we ask that you leave a brief message regarding your request.    Sincerely,      Mame Serrano, RN, BSN, CCTC  Your Liver Transplant Coordinator    [facility]  [facility] [place] [address]  [phone]

## 2018-09-10 NOTE — TELEPHONE ENCOUNTER
----- Message from Shala Hernandez MD sent at 9/3/2018 11:19 PM CDT -----  The Labs are stable; need CSA level - please let patient know.

## 2018-10-21 DIAGNOSIS — Z94.4 STATUS POST LIVER TRANSPLANT: ICD-10-CM

## 2018-10-21 DIAGNOSIS — R60.9 EDEMA, UNSPECIFIED TYPE: ICD-10-CM

## 2018-10-21 RX ORDER — PREDNISONE 5 MG/1
5 TABLET ORAL DAILY
Qty: 90 TABLET | Refills: 0 | Status: SHIPPED | OUTPATIENT
Start: 2018-10-21 | End: 2019-02-10 | Stop reason: SDUPTHER

## 2018-10-21 RX ORDER — FUROSEMIDE 20 MG/1
20 TABLET ORAL DAILY
Qty: 90 TABLET | Refills: 0 | Status: SHIPPED | OUTPATIENT
Start: 2018-10-21

## 2018-11-01 DIAGNOSIS — Z94.4 STATUS POST LIVER TRANSPLANT: ICD-10-CM

## 2018-11-01 RX ORDER — CYCLOSPORINE 25 MG/1
CAPSULE, LIQUID FILLED ORAL
Qty: 90 CAPSULE | Refills: 3 | Status: SHIPPED | OUTPATIENT
Start: 2018-11-01 | End: 2019-10-11 | Stop reason: SDUPTHER

## 2018-11-01 RX ORDER — CYCLOSPORINE 100 MG/1
CAPSULE, LIQUID FILLED ORAL
Qty: 180 CAPSULE | Refills: 3 | Status: SHIPPED | OUTPATIENT
Start: 2018-11-01 | End: 2019-04-08 | Stop reason: ALTCHOICE

## 2018-11-05 ENCOUNTER — TELEPHONE (OUTPATIENT)
Dept: TRANSPLANT | Facility: CLINIC | Age: 73
End: 2018-11-05

## 2018-11-05 NOTE — TELEPHONE ENCOUNTER
Called pt's  and reviewed that Mrs Stout would be due for labs on 11/26. He would like her to do them at [facility]. Orders faxed. He also stated pt not doing to well, she just sits and watches tv all day and has beginnings of dementia. Her PCP put her on an antidepressant. Will schedule a Dr Hernandez appt for being of Dec.

## 2018-11-17 DIAGNOSIS — K21.9 GASTROESOPHAGEAL REFLUX DISEASE WITHOUT ESOPHAGITIS: ICD-10-CM

## 2018-11-17 RX ORDER — FAMOTIDINE 40 MG/1
40 TABLET, FILM COATED ORAL DAILY
Qty: 90 TABLET | Refills: 3 | Status: SHIPPED | OUTPATIENT
Start: 2018-11-17

## 2018-11-27 LAB
CYCLOSPORINE: 217
EXT ALBUMIN: 3.5
EXT ALKALINE PHOSPHATASE: 107
EXT ALT: 19
EXT AST: 31
EXT BASOPHIL%: 0.2
EXT BILIRUBIN TOTAL: 0.6
EXT BUN: 20
EXT CALCIUM: 10
EXT CHLORIDE: 104
EXT CO2: 28
EXT CREATININE: 0.95 MG/DL
EXT EOSINOPHIL%: 3.3
EXT GLUCOSE: 127
EXT HEMATOCRIT: 39.4
EXT HEMOGLOBIN: 11.9
EXT LYMPH%: 15.5
EXT MONOCYTES%: 6.2
EXT PLATELETS: 172
EXT POTASSIUM: 4.9
EXT PROTEIN TOTAL: 5.9
EXT SEGS%: 74.8
EXT SODIUM: 145 MMOL/L
EXT WBC: 10
Lab: NORMAL

## 2018-12-03 ENCOUNTER — TELEPHONE (OUTPATIENT)
Dept: TRANSPLANT | Facility: CLINIC | Age: 73
End: 2018-12-03

## 2018-12-03 NOTE — TELEPHONE ENCOUNTER
Labs reviewed and stable. No med changes. Next labs due on 3/4/19. Pt has updated standing lab orders.

## 2018-12-03 NOTE — TELEPHONE ENCOUNTER
----- Message from Shala Hernandez MD sent at 11/27/2018  8:41 PM CST -----  The Labs are stable; need IS level - please let patient know.

## 2018-12-03 NOTE — TELEPHONE ENCOUNTER
----- Message from Shala Hernandez MD sent at 12/3/2018  4:13 PM CST -----  The Labs are stable - please let patient know.

## 2018-12-03 NOTE — LETTER
December 3, 2018    Lillian Stout  [address] [facility] [address]          Dear Lillian Stout:  MRN: [id number]    Your lab results were stable.  There are no medicine changes.  Please have your labs drawn again on 3/4/19.      If you cannot have your labs drawn on the scheduled date, it is your responsibility to call the transplant department to reschedule.  To reschedule or make an appointment, please as to speak to or leave a message for my assistant, Lia Leo, at [phone].  When leaving a message for Felipa Fitzgerald, or myself, we ask that you leave a brief message regarding your request.    Happy Holidays,      Mame Serrano, RN, BSN, CCTC  Your Liver Transplant Coordinator    [facility]  [facility] [place] [address]  [phone]

## 2018-12-05 ENCOUNTER — OFFICE VISIT (OUTPATIENT)
Dept: TRANSPLANT | Facility: CLINIC | Age: 73
End: 2018-12-05
Attending: INTERNAL MEDICINE
Payer: MEDICARE

## 2018-12-05 ENCOUNTER — HOSPITAL ENCOUNTER (OUTPATIENT)
Dept: CARDIOLOGY | Facility: OTHER | Age: 73
Discharge: HOME | End: 2018-12-05
Attending: INTERNAL MEDICINE
Payer: MEDICARE

## 2018-12-05 VITALS
OXYGEN SATURATION: 95 % | RESPIRATION RATE: 16 BRPM | WEIGHT: 193.81 LBS | HEIGHT: 65 IN | BODY MASS INDEX: 32.29 KG/M2 | HEART RATE: 61 BPM | SYSTOLIC BLOOD PRESSURE: 131 MMHG | DIASTOLIC BLOOD PRESSURE: 54 MMHG | TEMPERATURE: 97 F

## 2018-12-05 DIAGNOSIS — I49.9 IRREGULAR HEARTBEAT: ICD-10-CM

## 2018-12-05 DIAGNOSIS — Z94.4 S/P LIVER TRANSPLANT: Chronic | ICD-10-CM

## 2018-12-05 DIAGNOSIS — Z29.89 PROPHYLACTIC IMMUNOTHERAPY: Chronic | ICD-10-CM

## 2018-12-05 DIAGNOSIS — I49.9 IRREGULAR HEARTBEAT: Primary | ICD-10-CM

## 2018-12-05 DIAGNOSIS — D84.9 IMMUNOSUPPRESSED STATUS: ICD-10-CM

## 2018-12-05 DIAGNOSIS — R53.81 DEBILITY: ICD-10-CM

## 2018-12-05 PROCEDURE — 93005 ELECTROCARDIOGRAM TRACING: CPT

## 2018-12-05 PROCEDURE — 99215 OFFICE O/P EST HI 40 MIN: CPT | Mod: S$GLB,,, | Performed by: INTERNAL MEDICINE

## 2018-12-05 PROCEDURE — 93010 ELECTROCARDIOGRAM REPORT: CPT | Mod: ,,, | Performed by: INTERNAL MEDICINE

## 2018-12-05 RX ORDER — ESCITALOPRAM OXALATE 10 MG/1
5 TABLET ORAL DAILY
COMMUNITY
End: 2019-04-08 | Stop reason: ALTCHOICE

## 2018-12-05 RX ORDER — MEMANTINE HYDROCHLORIDE 10 MG/1
5 TABLET ORAL 2 TIMES DAILY
COMMUNITY

## 2018-12-05 RX ORDER — RIVASTIGMINE 9.5 MG/24H
1 PATCH, EXTENDED RELEASE TRANSDERMAL DAILY
COMMUNITY

## 2018-12-05 NOTE — PATIENT INSTRUCTIONS
1. To see neurologist for leg weakness  2. 2D echo for weakness and swelling of the ankles-see cardiologist  3. Start the cpap  4. Get pcp to stop metoprolol  5. See Dr ge re weakness and all new meds  6. lexapro - decrease to 5 mg every other day x 3-4 weeks then stop  7. ekg today  8. I will call your pcp and let him know my concerns  Return 4 months

## 2018-12-05 NOTE — Clinical Note
Please call pt's pcp and let him know my concerns1. To see neurologist for leg weakness2. 2D echo for weakness and swelling of the ankles-see cardiologist3. Start the cpap4. Get pcp to stop metoprolol5. See Dr ge re weakness and all new meds6. lexapro - decrease to 5 mg every other day x 3-4 weeks then stop7. ekg today8. I will call your pcp and let him know my concernsReturn 4 month

## 2018-12-05 NOTE — PROGRESS NOTES
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    ORGAN: LIVER  Whole or Partial: whole liver  Donor Type:  - brain death  CDC High Risk: no  Donor CMV Status: positive  Donor HCV Status: negative  Donor HBcAb: negative  Biliary Anastomosis: end to end  Arterial Anatomy: standard  IVC reconstruction: end to end ivc  Portal vein status: completely thrombosed    She had a long complicted course post liver-kidney transplant.     Subjective:     Interval History: Lillian is now 4 years post liver-kidney transplant. She has become very weak, is barely walking and is falling.    She is still on CSA and prednisone (not cellcept). Liver enzymes are normal. She had biliary stents removed in  and they were not replaced. Her last u/s with doppler was in Oct/15 and was normal. Liver tests are low. She had her IVC filter removed on 2/3/16.    She continues to c/o fatigue and had a sleep study. CPAP was recommended. She has not been using it due to how the mask fits.    She has started rivastigmine (started aug 2018) and namenda for dementia.    She sees a dermatologist annually.  She also has regular mammograms and pap tests. Bone density was done in 2016 and showed osteoporosis. She has a Rx for alendronate. She has colonoscopies done locally.      Review of Systems   Constitutional: Negative.    HENT: Negative.    Eyes: Negative.    Respiratory: Negative.    Cardiovascular: Negative.    Gastrointestinal: Negative.    Genitourinary: Negative.    Skin: Negative.    Neurological: Negative.    Psychiatric/Behavioral: Negative.        Objective:     Physical Exam   Constitutional: She is oriented to person, place, and time. She appears well-developed and well-nourished.   HENT:   Head: Normocephalic and atraumatic.   Eyes: Conjunctivae  and EOM are normal. Pupils are equal, round, and reactive to light. No scleral icterus.   Neck: Normal range of motion. Neck supple. No thyromegaly present.   Cardiovascular: Normal rate, regular rhythm and normal heart sounds.   Pulmonary/Chest: Effort normal and breath sounds normal. She has no rales.   Abdominal: Soft. Bowel sounds are normal. She exhibits no distension and no mass. There is no tenderness.   Musculoskeletal: Normal range of motion. She exhibits no edema.   Neurological: She is alert and oriented to person, place, and time.   Skin: Skin is warm and dry. No rash noted.   Psychiatric: She has a normal mood and affect.   Vitals reviewed.    Lab Results   Component Value Date    BILITOT 0.6 11/13/2017    AST 28 11/13/2017    ALT 21 11/13/2017    ALKPHOS 114 11/13/2017    CREATININE 0.9 11/13/2017    ALBUMIN 3.1 (L) 11/13/2017     Lab Results   Component Value Date    WBC 14.98 (H) 11/13/2017    HGB 13.1 11/13/2017    HCT 41.1 11/13/2017    HCT 22 (L) 12/09/2014     11/13/2017     Lab Results   Component Value Date    TACROLIMUS <1.5 (L) 07/25/2015    CYCLOSPORINE 217 11/26/2018       Assessment/Plan:     The patient is now 4 years s/p liver- kidney transplant and has excellent allograft function. However, she is very weak and falling. Current recommendations:  1. Complication of liver tx; biliary stricture: stents out: monitor for recurrence  2. S/p liver-kidney tx and control of IS: continue csa and prednisone; continue to hold cellcept (target trough )  3. DM, continue insulin  4. Malnutrition, improved: monitor  5. Zoster: no recurrence; monitor  6. Fatigue and falling: consider change to alternate antihypertensive agent; metoprolol may be causing fatigue; wear cpap; to see neurologist about possible side effects of  Dementia meds; pt to have 2D echo to rule out cardiac cause of weakness; taper off lexapro; ekg today  7. Diarrhea with incontinence.  Stable - metamucil and imodium  prn  9. Lasix: edema, ongoing: continue lasix  10. Na chloride: continue  11. Health maintenance: the patient should see a dermatologist annually to screen for skin cancer, perform regular colonoscopies, pap tests and mammograms  12. R/O osteoporosis.I agree with alendronate; repeat bone densityin now  Return 4 months    Shala Hernandez MD           [facility] Patient Status  Functional Status: 50% - Requires considerable assistance and frequent medical care  Physical Capacity: Limited Mobility  . . . . . . .

## 2018-12-05 NOTE — LETTER
December 9, 2018        Julian Santo  [address] [facility] [address]  Phone: [phone]  Fax: [phone]             [facility]  [address] [facility] [address]  [facility] [address]  Phone: [phone]  Fax: [phone]   Patient: Lillian Stout   MR Number: [id number]   YOB: 1945   Date of Visit: 12/5/2018       Dear Dr. Julian Santo    Thank you for referring Lillian Stout to me for evaluation. Attached you will find relevant portions of my assessment and plan of care.    If you have questions, please do not hesitate to call me. I look forward to following Lillian Stout along with you.    Sincerely,    Shala Hernandez MD    Enclosure    If you would like to receive this communication electronically, please contact [email or web address] or [phone] to request [employer] Link access.    [employer] Link is a tool which provides read-only access to select patient information with whom you have a relationship. Its easy to use and provides real time access to review your patients record including encounter summaries, notes, results, and demographic information.    If you feel you have received this communication in error or would no longer like to receive these types of communications, please e-mail [email or web address]

## 2018-12-10 ENCOUNTER — TELEPHONE (OUTPATIENT)
Dept: TRANSPLANT | Facility: CLINIC | Age: 73
End: 2018-12-10

## 2018-12-10 ENCOUNTER — PATIENT MESSAGE (OUTPATIENT)
Dept: TRANSPLANT | Facility: CLINIC | Age: 73
End: 2018-12-10

## 2018-12-10 NOTE — TELEPHONE ENCOUNTER
Noted. Msg sent ot Dr Hernandez to  Verify if she did call PCP, who is Dr Sanders, and will set up 2d echo, and Neurology appt.

## 2018-12-10 NOTE — TELEPHONE ENCOUNTER
----- Message from Shala Hernandez MD sent at 12/9/2018 11:10 PM CST -----  Please call pt's pcp and let him know my concerns  1. To see neurologist for leg weakness  2. 2D echo for weakness and swelling of the ankles-see cardiologist  3. Start the cpap  4. Get pcp to stop metoprolol  5. See Dr ge re weakness and all new meds  6. lexapro - decrease to 5 mg every other day x 3-4 weeks then stop  7. ekg today  8. I will call your pcp and let him know my concerns  Return 4 month

## 2018-12-10 NOTE — TELEPHONE ENCOUNTER
----- Message from Shala Hernandez MD sent at 12/10/2018 12:43 AM CST -----  ekg stable - please let patient know.

## 2018-12-10 NOTE — TELEPHONE ENCOUNTER
----- Message from Shala Hernandez MD sent at 12/10/2018 12:47 PM CST -----  Regarding: RE: Question  Mame, she has a neurologist and I gave her a requisition to do a 2D echo. Yes, please call her pcp for me. If he has questions, give him my cell  ----- Message -----  From: Mame Serrano RN  Sent: 12/10/2018  12:29 PM  To: Shala Hernandez MD  Subject: Question                                         Hi Dr Hernandez,    Quick questions about Mrs Stout's clinic visit. I was not sure if you called her PCP or if we needed to because the note states both. Also, I am not sure who Dr Sanders is. Is this her PCP? I will make her a Neurology appt and order a 2D echo.    Thanks,  Mame

## 2018-12-21 DIAGNOSIS — R53.83 FATIGUE, UNSPECIFIED TYPE: ICD-10-CM

## 2018-12-21 DIAGNOSIS — R41.3 MEMORY DISORDER: ICD-10-CM

## 2018-12-21 DIAGNOSIS — Z79.52 LONG TERM (CURRENT) USE OF SYSTEMIC STEROIDS: ICD-10-CM

## 2018-12-21 DIAGNOSIS — R29.6 FREQUENT FALLS: ICD-10-CM

## 2018-12-21 DIAGNOSIS — Z94.4 LIVER TRANSPLANTED: Primary | ICD-10-CM

## 2018-12-27 ENCOUNTER — PATIENT MESSAGE (OUTPATIENT)
Dept: TRANSPLANT | Facility: CLINIC | Age: 73
End: 2018-12-27

## 2019-02-01 ENCOUNTER — TELEPHONE (OUTPATIENT)
Dept: TRANSPLANT | Facility: CLINIC | Age: 74
End: 2019-02-01

## 2019-02-01 NOTE — TELEPHONE ENCOUNTER
----- Message from Shala Hernandez MD sent at 1/25/2019  6:44 PM CST -----  Repeat labs now to check csa level - please let patient know.

## 2019-02-10 DIAGNOSIS — Z94.4 STATUS POST LIVER TRANSPLANT: ICD-10-CM

## 2019-02-10 RX ORDER — PREDNISONE 5 MG/1
5 TABLET ORAL DAILY
Qty: 90 TABLET | Refills: 0 | Status: SHIPPED | OUTPATIENT
Start: 2019-02-10 | End: 2019-06-05 | Stop reason: SDUPTHER

## 2019-03-06 LAB
APPEARANCE, UA: ABNORMAL
COLOR, POC UA: YELLOW
CREATININE RANDOM URINE: 228 MG/DL
EXT ALBUMIN: 3.7
EXT ALKALINE PHOSPHATASE: 107
EXT ALT: 17
EXT AST: 32
EXT BASOPHIL%: 0.3
EXT BILIRUBIN TOTAL: 0.5
EXT BUN: 21
EXT CALCIUM: 9.4
EXT CHLORIDE: 103
EXT CO2: 27
EXT CREATININE: 0.87 MG/DL
EXT EOSINOPHIL%: 5
EXT GLUCOSE: 136
EXT HEMATOCRIT: 39.5
EXT HEMOGLOBIN: 11.4
EXT LYMPH%: 16.5
EXT MAGNESIUM: 1.5
EXT MONOCYTES%: 6.9
EXT PHOSPHORUS: 4.3
EXT PLATELETS: 158
EXT POTASSIUM: 4.5
EXT PROTEIN TOTAL: 6
EXT SEGS%: 71.3
EXT SODIUM: 144 MMOL/L
EXT TACROLIMUS LVL: ABNORMAL
EXT WBC: 11.3
LEUKOCYTE ESTERASE UR QL STRIP: ABNORMAL
NITRITE, POC UA: ABNORMAL
PH UR STRIP: 5 [PH] (ref 4.5–8)
PROT SERPL-MCNC: 24 G/DL
SP GR UR STRIP: 1.03 (ref 1–1.03)

## 2019-03-07 ENCOUNTER — TELEPHONE (OUTPATIENT)
Dept: TRANSPLANT | Facility: CLINIC | Age: 74
End: 2019-03-07

## 2019-03-07 LAB
APPEARANCE, UA: ABNORMAL
BILIRUBIN UA: NEGATIVE
BK VIRUS DNA, BLOOD: 3090
COLOR, POC UA: YELLOW
CREATININE RANDOM URINE: 228 MG/DL
EXT ALBUMIN: 3.7
EXT ALKALINE PHOSPHATASE: 107
EXT ALT: 17
EXT AST: 32
EXT BACTERIA UA: ABNORMAL
EXT BASOPHIL%: 0.3
EXT BILIRUBIN TOTAL: 0.5
EXT BUN: 21
EXT CALCIUM: 9.4
EXT CHLORIDE: 103
EXT CO2: 27
EXT CREATININE: 0.87 MG/DL
EXT CYCLOSPORONE LEVEL: 223
EXT EOSINOPHIL%: 5
EXT GLUCOSE: 136
EXT HEMATOCRIT: 39.5
EXT HEMOGLOBIN: 11.4
EXT LYMPH%: 16.5
EXT MAGNESIUM: 1.5
EXT MONOCYTES%: 6.9
EXT PHOSPHORUS: 4.3
EXT PLATELETS: 158
EXT POTASSIUM: 4.5
EXT PROTEIN TOTAL: 6
EXT PROTEIN UA: ABNORMAL
EXT RBC UA: ABNORMAL
EXT SEGS%: 71.3
EXT SODIUM: 144 MMOL/L
EXT WBC UA: ABNORMAL
EXT WBC: 11.3
LEUKOCYTE ESTERASE UR QL STRIP: ABNORMAL
Lab: DETECTED
NITRITE, POC UA: ABNORMAL
PH UR STRIP: 5 [PH] (ref 4.5–8)
PROTEIN URINE RANDOM: 24
SP GR UR STRIP: 1.03 (ref 1–1.03)

## 2019-03-11 ENCOUNTER — TELEPHONE (OUTPATIENT)
Dept: TRANSPLANT | Facility: CLINIC | Age: 74
End: 2019-03-11

## 2019-03-11 NOTE — TELEPHONE ENCOUNTER
----- Message from Shala Hernandez MD sent at 3/8/2019 10:55 AM CST -----  The Labs are stable - please let patient know.

## 2019-03-11 NOTE — LETTER
March 11, 2019    Lillian Stout  [address] [facility] [address] [facility] [address]          Dear Lillian Stout:  MRN: [id number]    This is a follow up to your recent labs, your lab results were stable.  There are no medicine changes.  Please have your labs drawn again on 6/10/19.      If you cannot have your labs drawn on the scheduled date, it is your responsibility to call the transplant department to reschedule.  To reschedule or make an appointment, please as to speak to or leave a message for my assistant, Kenia Fitzgerald or Mame, at [phone].  When leaving a message for Kenia Fitzgerald Angela or myself, we ask that you leave a brief message regarding your request.    Sincerely,      Mame Serrano, RN, BSN, CCTC  Your Liver Transplant Coordinator    [facility]  [facility] [place] [address]  [phone]

## 2019-03-15 ENCOUNTER — TELEPHONE (OUTPATIENT)
Dept: TRANSPLANT | Facility: CLINIC | Age: 74
End: 2019-03-15

## 2019-03-15 NOTE — TELEPHONE ENCOUNTER
----- Message from Shala Hernandez MD sent at 3/11/2019  8:50 PM CDT -----  The Labs are stable - please let patient know.

## 2019-03-18 ENCOUNTER — PATIENT MESSAGE (OUTPATIENT)
Dept: TRANSPLANT | Facility: CLINIC | Age: 74
End: 2019-03-18

## 2019-04-08 ENCOUNTER — OFFICE VISIT (OUTPATIENT)
Dept: TRANSPLANT | Facility: CLINIC | Age: 74
End: 2019-04-08
Attending: INTERNAL MEDICINE
Payer: MEDICARE

## 2019-04-08 VITALS
OXYGEN SATURATION: 96 % | TEMPERATURE: 98 F | BODY MASS INDEX: 32.99 KG/M2 | HEIGHT: 65 IN | WEIGHT: 198 LBS | HEART RATE: 81 BPM | DIASTOLIC BLOOD PRESSURE: 51 MMHG | RESPIRATION RATE: 19 BRPM | SYSTOLIC BLOOD PRESSURE: 134 MMHG

## 2019-04-08 DIAGNOSIS — Z79.60 LONG-TERM USE OF IMMUNOSUPPRESSANT MEDICATION: ICD-10-CM

## 2019-04-08 DIAGNOSIS — Z29.89 NEED FOR PROPHYLACTIC IMMUNOTHERAPY: Primary | ICD-10-CM

## 2019-04-08 DIAGNOSIS — I77.1 STENOSIS OF HEPATIC ARTERY OF TRANSPLANTED LIVER: ICD-10-CM

## 2019-04-08 DIAGNOSIS — Z94.4 LIVER TRANSPLANTED: ICD-10-CM

## 2019-04-08 DIAGNOSIS — T86.49 STENOSIS OF HEPATIC ARTERY OF TRANSPLANTED LIVER: ICD-10-CM

## 2019-04-08 PROCEDURE — 99215 PR OFFICE/OUTPT VISIT, EST, LEVL V, 40-54 MIN: ICD-10-PCS | Mod: S$GLB,,, | Performed by: INTERNAL MEDICINE

## 2019-04-08 PROCEDURE — 99215 OFFICE O/P EST HI 40 MIN: CPT | Mod: S$GLB,,, | Performed by: INTERNAL MEDICINE

## 2019-04-08 RX ORDER — DIAZEPAM 2.5 MG/.5ML
2.5 GEL RECTAL DAILY
COMMUNITY
End: 2019-04-08 | Stop reason: ALTCHOICE

## 2019-04-08 RX ORDER — DIAZEPAM 5 MG/1
2.5 TABLET ORAL NIGHTLY PRN
COMMUNITY

## 2019-04-08 NOTE — LETTER
April 14, 2019        Julian Santo  [address] [facility] [address]  Phone: [phone]  Fax: [phone]             [address]  [address] [facility] [address]  [facility] [address]  Phone: [phone]  Fax: [phone]   Patient: Lillian Stout   MR Number: [id number]   YOB: 1945   Date of Visit: 4/8/2019       Dear Dr. Julian Santo    Thank you for referring Lillian Stout to me for evaluation. Attached you will find relevant portions of my assessment and plan of care.    If you have questions, please do not hesitate to call me. I look forward to following Lillian Stout along with you.    Sincerely,    Shala Hernandez MD    Enclosure    If you would like to receive this communication electronically, please contact [email or web address] or [phone] to request One On One Link access.    One On One Link is a tool which provides read-only access to select patient information with whom you have a relationship. Its easy to use and provides real time access to review your patients record including encounter summaries, notes, results, and demographic information.    If you feel you have received this communication in error or would no longer like to receive these types of communications, please e-mail [email or web address]

## 2019-04-08 NOTE — PROGRESS NOTES
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    ORGAN: LIVER  Whole or Partial: whole liver  Donor Type:  - brain death  CDC High Risk: no  Donor CMV Status: positive  Donor HCV Status: negative  Donor HBcAb: negative  Biliary Anastomosis: end to end  Arterial Anatomy: standard  IVC reconstruction: end to end ivc  Portal vein status: completely thrombosed    She had a long complicted course post liver-kidney transplant.     Subjective:     Interval History: Lillian is now 4 years post liver-kidney transplant. She has become very weak, is barely walking and is falling.    She is still on CSA and prednisone (not cellcept). Liver enzymes are normal. She had biliary stents removed in  and they were not replaced. Her last u/s with doppler was in Oct/15 and was normal. Liver tests are low. She had her IVC filter removed on 2/3/16.    She continues to c/o fatigue and had a sleep study. CPAP was recommended. She is using it but still not continuously.    She has started rivastigmine (started aug 2018) and namenda for dementia.    She sees a dermatologist annually.  She also has regular mammograms and pap tests. Bone density was done in 2016 and showed osteoporosis. She has a Rx for alendronate. She has colonoscopies done locally.      Review of Systems   Constitutional: Negative.    HENT: Negative.    Eyes: Negative.    Respiratory: Negative.    Cardiovascular: Negative.    Gastrointestinal: Negative.    Genitourinary: Negative.    Skin: Negative.    Neurological: Negative.    Psychiatric/Behavioral: Negative.        Objective:     Physical Exam   Constitutional: She is oriented to person, place, and time. She appears well-developed and well-nourished.   HENT:   Head: Normocephalic and atraumatic.   Eyes: Pupils are equal,  round, and reactive to light. Conjunctivae and EOM are normal. No scleral icterus.   Neck: Normal range of motion. Neck supple. No thyromegaly present.   Cardiovascular: Normal rate, regular rhythm and normal heart sounds.   Pulmonary/Chest: Effort normal and breath sounds normal. She has no rales.   Abdominal: Soft. Bowel sounds are normal. She exhibits no distension and no mass. There is no tenderness.   Musculoskeletal: Normal range of motion. She exhibits no edema.   Neurological: She is alert and oriented to person, place, and time.   Skin: Skin is warm and dry. No rash noted.   Psychiatric: She has a normal mood and affect.   Vitals reviewed.    Lab Results   Component Value Date    BILITOT 0.6 11/13/2017    AST 28 11/13/2017    ALT 21 11/13/2017    ALKPHOS 114 11/13/2017    CREATININE 0.9 11/13/2017    ALBUMIN 3.1 (L) 11/13/2017     Lab Results   Component Value Date    WBC 14.98 (H) 11/13/2017    HGB 13.1 11/13/2017    HCT 41.1 11/13/2017    HCT 22 (L) 12/09/2014     11/13/2017     Lab Results   Component Value Date    TACROLIMUS <1.5 (L) 07/25/2015    CYCLOSPORINE 217 11/26/2018       Assessment/Plan:     The patient is now 4 years s/p liver- kidney transplant and has excellent allograft function. However, she is very weak and falling. Current recommendations:  1. Complication of liver tx; biliary stricture: stents out: monitor for recurrence  2. S/p liver-kidney tx and control of IS: continue csa and prednisone; continue to hold cellcept (target trough )  3. DM, continue insulin  4. Malnutrition, improved: monitor  5. Zoster: no recurrence; monitor  6. Fatigue and falling: consider change to alternate antihypertensive agent; metoprolol may be causing fatigue; wear cpap; to see neurologist about possible side effects of  Dementia meds; pt to have 2D echo to rule out cardiac cause of weakness; taper off lexapro; ekg today  7. Diarrhea with incontinence.  Stable - metamucil and imodium prn  9.  Lasix: edema, ongoing: continue lasix  10. Na chloride: continue  11. Health maintenance: the patient should see a dermatologist annually to screen for skin cancer, perform regular colonoscopies, pap tests and mammograms  12. R/O osteoporosis.I agree with alendronate; repeat bone densityin now  Return 4 months    Shala Hernandez MD           [facility] Patient Status  Functional Status: 50% - Requires considerable assistance and frequent medical care  Physical Capacity: Limited Mobility  . . . . . . . .

## 2019-04-24 ENCOUNTER — TELEPHONE (OUTPATIENT)
Dept: TRANSPLANT | Facility: CLINIC | Age: 74
End: 2019-04-24

## 2019-05-02 ENCOUNTER — PATIENT MESSAGE (OUTPATIENT)
Dept: TRANSPLANT | Facility: CLINIC | Age: 74
End: 2019-05-02

## 2019-05-22 ENCOUNTER — PATIENT MESSAGE (OUTPATIENT)
Dept: TRANSPLANT | Facility: CLINIC | Age: 74
End: 2019-05-22

## 2019-06-05 DIAGNOSIS — Z94.4 STATUS POST LIVER TRANSPLANT: ICD-10-CM

## 2019-06-05 RX ORDER — PREDNISONE 5 MG/1
5 TABLET ORAL DAILY
Qty: 90 TABLET | Refills: 0 | Status: SHIPPED | OUTPATIENT
Start: 2019-06-05 | End: 2019-08-30 | Stop reason: SDUPTHER

## 2019-06-10 ENCOUNTER — TELEPHONE (OUTPATIENT)
Dept: TRANSPLANT | Facility: CLINIC | Age: 74
End: 2019-06-10

## 2019-06-10 ENCOUNTER — PATIENT MESSAGE (OUTPATIENT)
Dept: TRANSPLANT | Facility: CLINIC | Age: 74
End: 2019-06-10

## 2019-06-10 NOTE — TELEPHONE ENCOUNTER
----- Message from Vicky Graham RN sent at 6/10/2019 11:32 AM CDT -----  Contact: Self      ----- Message -----  From: Colette Magaña  Sent: 6/10/2019   8:01 AM  To: Davdi Last Staff    Pt is calling because she was to have blood work today, but there are no orders for the type of blood she is to give.  She is requesting a returned call from Staff regarding those orders.    She can be reached at [phone].    Thank you.

## 2019-06-10 NOTE — TELEPHONE ENCOUNTER
Returned call to Mr Stout. He wanted to verify that pt had lab orders before he took his wife. Coord verified that updated labs were faxed to [place]. He will take Mrs Stout on Wednesday for labs. Will refax to make sure they have updated labs.

## 2019-06-13 LAB
EXT ALBUMIN: 3.5
EXT ALKALINE PHOSPHATASE: 108
EXT ALT: 14
EXT AST: 28
EXT BASOPHIL%: 0.2
EXT BILIRUBIN TOTAL: 0.4
EXT BUN: 24
EXT CALCIUM: 9.4
EXT CHLORIDE: 105
EXT CO2: 29
EXT CREATININE: 1.15 MG/DL
EXT CYCLOSPORONE LEVEL: 191
EXT EOSINOPHIL%: 3.8
EXT GLUCOSE: 103
EXT HEMATOCRIT: 36
EXT HEMOGLOBIN: 10.8
EXT LYMPH%: 17.8
EXT MONOCYTES%: 6.4
EXT PLATELETS: 179
EXT POTASSIUM: 4.5
EXT PROTEIN TOTAL: 5.8
EXT SEGS%: 71.8
EXT SODIUM: 145 MMOL/L
EXT WBC: 9

## 2019-06-18 ENCOUNTER — TELEPHONE (OUTPATIENT)
Dept: TRANSPLANT | Facility: CLINIC | Age: 74
End: 2019-06-18

## 2019-06-18 NOTE — TELEPHONE ENCOUNTER
----- Message from Shala Hernandez MD sent at 6/15/2019  6:07 PM CDT -----  The Labs are stable - please let patient know.

## 2019-06-18 NOTE — LETTER
June 18, 2019    Lillian Stout  [address] [facility] [address]          Dear Lillian Stout:  MRN: [id number]    This is a follow up to your recent labs, your lab results were stable.  There are no medicine changes.  Please have your labs drawn again on 9/16/2019.      If you cannot have your labs drawn on the scheduled date, it is your responsibility to call the transplant department to reschedule.  To reschedule or make an appointment, please as to speak to or leave a message for my assistant, Kenia Fitzgerald or Mame, at [phone].  When leaving a message for Kenia Fitzgerald Angela or myself, we ask that you leave a brief message regarding your request.    Sincerely,      Mame Serrano, RN, BSN, CCTC  Your Liver Transplant Coordinator    [facility]  [facility] [place] [address]  [phone]

## 2019-06-18 NOTE — TELEPHONE ENCOUNTER
Dr David Munguia reviewed your labs and they are stable. No medicaton changes. Next labs due 9/16/19.    Thanks,    CARLA Vilchis

## 2019-06-24 ENCOUNTER — TELEPHONE (OUTPATIENT)
Dept: TRANSPLANT | Facility: CLINIC | Age: 74
End: 2019-06-24

## 2019-06-24 NOTE — TELEPHONE ENCOUNTER
----- Message from Zenaida Sales MA sent at 6/24/2019  3:55 PM CDT -----  Contact: Melody shafer/ Dr. Womack's office      ----- Message -----  From: Spenser Kent  Sent: 6/24/2019   3:05 PM  To: David Last Staff    Needs Advice    Reason for call: Dr. Womack need to speak with Dr. Hernandez regarding the pt        Communication Preference: Office: [phone]/ Cell: [phone]    Additional Information: Have questions about pt's medication before doing procedure

## 2019-08-30 DIAGNOSIS — Z94.4 STATUS POST LIVER TRANSPLANT: ICD-10-CM

## 2019-08-30 RX ORDER — PREDNISONE 5 MG/1
TABLET ORAL
Qty: 90 TABLET | Refills: 0 | Status: SHIPPED | OUTPATIENT
Start: 2019-08-30 | End: 2019-11-22 | Stop reason: SDUPTHER

## 2019-09-18 LAB
CYCLOSPORINE: 203.6
EXT ALBUMIN: 3.6
EXT ALKALINE PHOSPHATASE: 86
EXT ALT: 9
EXT AST: 16
EXT BASOPHIL%: 0.2
EXT BILIRUBIN TOTAL: 0.4
EXT BUN: 16
EXT CALCIUM: 9.6
EXT CHLORIDE: 104
EXT CO2: 28
EXT CREATININE: 1.2 MG/DL
EXT EOSINOPHIL%: 2.1
EXT GLUCOSE: 218
EXT HEMATOCRIT: 38.4
EXT HEMOGLOBIN: 11.6
EXT LYMPH%: 15.3
EXT MONOCYTES%: 7.1
EXT PLATELETS: 141
EXT POTASSIUM: 4.4
EXT PROTEIN TOTAL: 6
EXT SEGS%: 75.3
EXT SODIUM: 144 MMOL/L
EXT WBC: 10.1

## 2019-09-19 ENCOUNTER — PATIENT MESSAGE (OUTPATIENT)
Dept: TRANSPLANT | Facility: CLINIC | Age: 74
End: 2019-09-19

## 2019-09-19 ENCOUNTER — TELEPHONE (OUTPATIENT)
Dept: TRANSPLANT | Facility: CLINIC | Age: 74
End: 2019-09-19

## 2019-09-19 NOTE — LETTER
September 19, 2019    Lillian Stout  [address] [facility] [address]          Dear Lillian Stout:  MRN: [id number]    This is a follow up to your recent labs, your lab results were stable.  There are no medicine changes.  Please have your labs drawn again on 1/6/2020.      If you cannot have your labs drawn on the scheduled date, it is your responsibility to call the transplant department to reschedule.  To reschedule or make an appointment, please as to speak to or leave a message for my assistant, Kenia Fitzgerald or Mame, at [phone].  When leaving a message for Kenia Fitzgerald Angela or myself, we ask that you leave a brief message regarding your request.    Sincerely,      Mame Serrano, RN, BSN, CCTC  Your Liver Transplant Coordinator    [facility]  [facility] [place] [address]  [phone]

## 2019-09-19 NOTE — TELEPHONE ENCOUNTER
----- Message from Shala Hernandez MD sent at 9/18/2019 12:05 PM CDT -----  The Labs are stable - please let patient know.  
Labs reviewed and stable. No med changes. Next labs 1/6/2020.  
20

## 2019-09-27 ENCOUNTER — TELEPHONE (OUTPATIENT)
Dept: TRANSPLANT | Facility: CLINIC | Age: 74
End: 2019-09-27

## 2019-09-27 NOTE — TELEPHONE ENCOUNTER
----- Message from Shala Hernandez MD sent at 9/24/2019  9:24 PM CDT -----  The Labs are stable - please let patient know.

## 2019-10-11 DIAGNOSIS — Z94.4 STATUS POST LIVER TRANSPLANT: ICD-10-CM

## 2019-10-11 RX ORDER — CYCLOSPORINE 25 MG/1
CAPSULE, LIQUID FILLED ORAL
Qty: 90 CAPSULE | Refills: 3 | Status: SHIPPED | OUTPATIENT
Start: 2019-10-11 | End: 2020-01-15 | Stop reason: SDUPTHER

## 2019-10-11 RX ORDER — CYCLOSPORINE 100 MG/1
CAPSULE, LIQUID FILLED ORAL
Qty: 180 CAPSULE | Refills: 3 | Status: SHIPPED | OUTPATIENT
Start: 2019-10-11 | End: 2020-01-20 | Stop reason: DRUGHIGH

## 2019-10-14 ENCOUNTER — OFFICE VISIT (OUTPATIENT)
Dept: TRANSPLANT | Facility: CLINIC | Age: 74
End: 2019-10-14
Payer: MEDICARE

## 2019-10-14 VITALS
OXYGEN SATURATION: 97 % | RESPIRATION RATE: 16 BRPM | HEART RATE: 86 BPM | DIASTOLIC BLOOD PRESSURE: 72 MMHG | SYSTOLIC BLOOD PRESSURE: 170 MMHG | HEIGHT: 65 IN | BODY MASS INDEX: 35.78 KG/M2 | WEIGHT: 214.75 LBS | TEMPERATURE: 97 F

## 2019-10-14 DIAGNOSIS — K83.1: ICD-10-CM

## 2019-10-14 DIAGNOSIS — Z94.4: ICD-10-CM

## 2019-10-14 DIAGNOSIS — Z94.0 S/P KIDNEY TRANSPLANT: Chronic | ICD-10-CM

## 2019-10-14 DIAGNOSIS — D84.9 IMMUNOSUPPRESSED STATUS: Primary | ICD-10-CM

## 2019-10-14 PROBLEM — F03.90 DEMENTIA WITHOUT BEHAVIORAL DISTURBANCE: Status: ACTIVE | Noted: 2019-10-14

## 2019-10-14 PROCEDURE — 99215 OFFICE O/P EST HI 40 MIN: CPT | Mod: S$PBB,,, | Performed by: INTERNAL MEDICINE

## 2019-10-14 PROCEDURE — 99999 PR PBB SHADOW E&M-EST. PATIENT-LVL III: CPT | Mod: PBBFAC,,, | Performed by: INTERNAL MEDICINE

## 2019-10-14 PROCEDURE — 99213 OFFICE O/P EST LOW 20 MIN: CPT | Mod: PBBFAC,PN | Performed by: INTERNAL MEDICINE

## 2019-10-14 PROCEDURE — 99999 PR PBB SHADOW E&M-EST. PATIENT-LVL III: ICD-10-PCS | Mod: PBBFAC,,, | Performed by: INTERNAL MEDICINE

## 2019-10-14 PROCEDURE — 99215 PR OFFICE/OUTPT VISIT, EST, LEVL V, 40-54 MIN: ICD-10-PCS | Mod: S$PBB,,, | Performed by: INTERNAL MEDICINE

## 2019-10-14 RX ORDER — SPIRONOLACTONE 25 MG/1
12.5 TABLET ORAL 2 TIMES DAILY
COMMUNITY

## 2019-10-14 NOTE — PROGRESS NOTES
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    ORGAN: LIVER  Whole or Partial: whole liver  Donor Type:  - brain death  [facility]note [facility] High Risk: no  Donor CMV Status: positive  Donor HCV Status: negative  Donor HBcAb: negative  Biliary Anastomosis: end to end  Arterial Anatomy: standard  IVC reconstruction: end to end ivc  Portal vein status: completely thrombosed    She had a long complicted course post liver-kidney transplant.     Subjective:     Interval History: Lillian is now almost 5 years post liver-kidney transplant. Her weakness has not worsened but it has not improved, either. She needs assistance getting from sitting to standing and with walking. Her  reports that her dementia is worsening. She saw the neurologist about about a month ago. No med changes were made.    She is still on CSA and prednisone (not cellcept). Liver enzymes are normal (19: Tbil 0.4, ALT 9, AST 16, ALKP 86, creat 1.2). CSA level was 191 ; level  not reported. She had biliary stents removed in  and they were not replaced. Her last u/s with doppler was in Oct/15 and was normal. She had her IVC filter removed on 2/3/16.    She continues to c/o fatigue and had a sleep study. CPAP was recommended. She is not using it at all anymore.    She has started rivastigmine (started aug 2018) and namenda for dementia.    She sees a dermatologist annually.  She also has regular mammograms and pap tests. Bone density was done in 2016 and showed osteoporosis. She is on alendronate. She has colonoscopies done locally.      Review of Systems   Constitutional: Negative.    HENT: Negative.    Eyes: Negative.    Respiratory: Negative.    Cardiovascular: Negative.    Gastrointestinal: Negative.    Genitourinary: Negative.    Skin: Negative.     Neurological: Negative.    Psychiatric/Behavioral: Negative.        Objective:     Vitals:    10/14/19 1302   BP: (!) 170/72   Pulse: 86   Resp: 16   Temp: 97.4 °F (36.3 °C)     Physical Exam   Constitutional: She is oriented to person, place, and time. She appears well-developed and well-nourished.   HENT:   Head: Normocephalic and atraumatic.   Eyes: Pupils are equal, round, and reactive to light. Conjunctivae and EOM are normal. No scleral icterus.   Neck: Normal range of motion. Neck supple. No thyromegaly present.   Cardiovascular: Normal rate, regular rhythm and normal heart sounds.   Pulmonary/Chest: Effort normal and breath sounds normal. She has no rales.   Abdominal: Soft. Bowel sounds are normal. She exhibits no distension and no mass. There is no tenderness.   Musculoskeletal: Normal range of motion. She exhibits no edema.   Neurological: She is alert and oriented to person, place, and time.   Skin: Skin is warm and dry. No rash noted.   Psychiatric: She has a normal mood and affect.   Vitals reviewed.    Lab Results   Component Value Date    BILITOT 0.6 11/13/2017    AST 28 11/13/2017    ALT 21 11/13/2017    ALKPHOS 114 11/13/2017    CREATININE 0.9 11/13/2017    ALBUMIN 3.1 (L) 11/13/2017     Lab Results   Component Value Date    WBC 14.98 (H) 11/13/2017    HGB 13.1 11/13/2017    HCT 41.1 11/13/2017    HCT 22 (L) 12/09/2014     11/13/2017     Lab Results   Component Value Date    TACROLIMUS <1.5 (L) 07/25/2015    CYCLOSPORINE 203.6 09/17/2019       Assessment/Plan:     The patient is now almost 5 years s/p liver- kidney transplant and has excellent allograft function. Current recommendations:  1. Complication of liver tx; biliary stricture: stents out: monitor for recurrence  2. S/p liver-kidney tx and control of IS: continue csa and prednisone; continue to hold cellcept (target trough ). Await next level - will try to get level from 09/17/19  3. DM, continue insulin  4. Malnutrition,  improved: monitor  5. Zoster: no recurrence; monitor  6. Fatigue, weakness and falling: by report no cardiac issues; recommend using cpap machine; recommend homehealth and PT; consider starting antidepressant  7. Diarrhea with incontinence.  Stable - metamucil and imodium prn  9. Edema, ongoing: continue lasix and spironolactone  10. Na chloride: continue  11. Health maintenance: the patient should see a dermatologist annually to screen for skin cancer, perform regular colonoscopies, pap tests and mammograms  12. R/O osteoporosis.I agree with alendronate; repeat bone density now  Return 6 months    Shala Hernandez MD           [facility] Patient Status  Functional Status: 50% - Requires considerable assistance and frequent medical care  Physical Capacity: Limited Mobility  . . . . . . . . .

## 2019-10-14 NOTE — LETTER
October 14, 2019        Julian Santo  [address] [facility] [address]  Phone: [phone]  Fax: [phone]             [place] [facility] Transplant  [address] [facility] [address]  Phone: [phone]  Fax: [phone]   Patient: Lillian Stout   MR Number: [id number]   YOB: 1945   Date of Visit: 10/14/2019       Dear Dr. Julian Santo    Thank you for referring Lillian Stout to me for evaluation. Attached you will find relevant portions of my assessment and plan of care.    If you have questions, please do not hesitate to call me. I look forward to following Lillian Stout along with you.    Sincerely,    Shala Hernandez MD    Enclosure    If you would like to receive this communication electronically, please contact [email or web address] or [phone] to request [employer] access.    [employer] is a tool which provides read-only access to select patient information with whom you have a relationship. Its easy to use and provides real time access to review your patients record including encounter summaries, notes, results, and demographic information.    If you feel you have received this communication in error or would no longer like to receive these types of communications, please e-mail [email or web address]

## 2019-11-22 DIAGNOSIS — Z94.4 STATUS POST LIVER TRANSPLANT: ICD-10-CM

## 2019-11-22 RX ORDER — PREDNISONE 5 MG/1
TABLET ORAL
Qty: 90 TABLET | Refills: 0 | Status: SHIPPED | OUTPATIENT
Start: 2019-11-22 | End: 2020-03-30 | Stop reason: SDUPTHER

## 2019-12-29 ENCOUNTER — PATIENT MESSAGE (OUTPATIENT)
Dept: TRANSPLANT | Facility: CLINIC | Age: 74
End: 2019-12-29

## 2019-12-30 DIAGNOSIS — Z94.4 LIVER TRANSPLANTED: ICD-10-CM

## 2019-12-30 DIAGNOSIS — K64.9 HEMORRHOIDS, UNSPECIFIED HEMORRHOID TYPE: Primary | ICD-10-CM

## 2020-01-06 LAB
CYCLOSPORINE: 299
EXT ALBUMIN: 3.8
EXT ALKALINE PHOSPHATASE: 105
EXT ALT: 26
EXT AST: 29
EXT BASOPHIL%: 0.2
EXT BILIRUBIN TOTAL: 0.5
EXT BUN: 36
EXT CALCIUM: 9.7
EXT CHLORIDE: 109
EXT CO2: 24
EXT CREATININE: 1.67 MG/DL
EXT EOSINOPHIL%: 2.6
EXT GLUCOSE: 129
EXT HEMATOCRIT: 38.8
EXT HEMOGLOBIN: 11.4
EXT LYMPH%: 14.6
EXT MONOCYTES%: 6.2
EXT PLATELETS: 178
EXT POTASSIUM: 5.4
EXT PROTEIN TOTAL: 6.1
EXT SEGS%: 75.7
EXT SODIUM: 144 MMOL/L
EXT WBC: 12.1

## 2020-01-15 ENCOUNTER — OFFICE VISIT (OUTPATIENT)
Dept: SURGERY | Facility: CLINIC | Age: 75
End: 2020-01-15
Payer: MEDICARE

## 2020-01-15 VITALS — HEIGHT: 65 IN | BODY MASS INDEX: 33.61 KG/M2 | WEIGHT: 201.75 LBS

## 2020-01-15 DIAGNOSIS — Z94.4 STATUS POST LIVER TRANSPLANT: ICD-10-CM

## 2020-01-15 DIAGNOSIS — R19.7 DIARRHEA, UNSPECIFIED TYPE: Primary | ICD-10-CM

## 2020-01-15 DIAGNOSIS — R15.0 INCOMPLETE DEFECATION: ICD-10-CM

## 2020-01-15 DIAGNOSIS — L29.0 PRURITUS ANI: ICD-10-CM

## 2020-01-15 PROCEDURE — 1126F PR PAIN SEVERITY QUANTIFIED, NO PAIN PRESENT: ICD-10-PCS | Mod: ,,, | Performed by: COLON & RECTAL SURGERY

## 2020-01-15 PROCEDURE — 99215 OFFICE O/P EST HI 40 MIN: CPT | Mod: PBBFAC | Performed by: COLON & RECTAL SURGERY

## 2020-01-15 PROCEDURE — 99203 PR OFFICE/OUTPT VISIT, NEW, LEVL III, 30-44 MIN: ICD-10-PCS | Mod: S$PBB,,, | Performed by: COLON & RECTAL SURGERY

## 2020-01-15 PROCEDURE — 1159F MED LIST DOCD IN RCRD: CPT | Mod: ,,, | Performed by: COLON & RECTAL SURGERY

## 2020-01-15 PROCEDURE — 99999 PR PBB SHADOW E&M-EST. PATIENT-LVL V: ICD-10-PCS | Mod: PBBFAC,,, | Performed by: COLON & RECTAL SURGERY

## 2020-01-15 PROCEDURE — 99999 PR PBB SHADOW E&M-EST. PATIENT-LVL V: CPT | Mod: PBBFAC,,, | Performed by: COLON & RECTAL SURGERY

## 2020-01-15 PROCEDURE — 99203 OFFICE O/P NEW LOW 30 MIN: CPT | Mod: S$PBB,,, | Performed by: COLON & RECTAL SURGERY

## 2020-01-15 PROCEDURE — 1159F PR MEDICATION LIST DOCUMENTED IN MEDICAL RECORD: ICD-10-PCS | Mod: ,,, | Performed by: COLON & RECTAL SURGERY

## 2020-01-15 PROCEDURE — 1126F AMNT PAIN NOTED NONE PRSNT: CPT | Mod: ,,, | Performed by: COLON & RECTAL SURGERY

## 2020-01-15 RX ORDER — FUROSEMIDE 20 MG/1
TABLET ORAL
COMMUNITY

## 2020-01-15 RX ORDER — INSULIN LISPRO 100 [IU]/ML
INJECTION, SOLUTION SUBCUTANEOUS
COMMUNITY

## 2020-01-15 RX ORDER — PROMETHAZINE HYDROCHLORIDE AND CODEINE PHOSPHATE 6.25; 1 MG/5ML; MG/5ML
SOLUTION ORAL
COMMUNITY
Start: 2019-03-04

## 2020-01-15 RX ORDER — ALENDRONATE SODIUM 70 MG/1
TABLET ORAL
COMMUNITY

## 2020-01-15 RX ORDER — VITAMIN B COMPLEX
TABLET ORAL
COMMUNITY

## 2020-01-15 RX ORDER — ASPIRIN 81 MG/1
TABLET ORAL
COMMUNITY

## 2020-01-15 RX ORDER — ASCORBIC ACID 125 MG
TABLET,CHEWABLE ORAL
COMMUNITY

## 2020-01-15 RX ORDER — CYCLOSPORINE 100 MG/1
CAPSULE, GELATIN COATED ORAL
COMMUNITY
Start: 2015-09-02 | End: 2020-01-15 | Stop reason: SDUPTHER

## 2020-01-15 RX ORDER — AMOXICILLIN 500 MG/1
TABLET, FILM COATED ORAL
COMMUNITY
Start: 2019-12-13

## 2020-01-15 RX ORDER — MEMANTINE HYDROCHLORIDE 10 MG/1
TABLET ORAL
COMMUNITY
Start: 2018-09-19

## 2020-01-15 RX ORDER — CYCLOSPORINE 25 MG/1
CAPSULE, GELATIN COATED ORAL
COMMUNITY
End: 2020-01-20 | Stop reason: DRUGHIGH

## 2020-01-15 RX ORDER — LIDOCAINE 50 MG/G
PATCH TOPICAL
COMMUNITY

## 2020-01-15 RX ORDER — PREDNISONE 5 MG/1
5 TABLET ORAL DAILY
COMMUNITY

## 2020-01-15 RX ORDER — FERROUS SULFATE 325(65) MG
TABLET, DELAYED RELEASE (ENTERIC COATED) ORAL
COMMUNITY

## 2020-01-15 RX ORDER — DIAZEPAM 5 MG/1
TABLET ORAL
COMMUNITY

## 2020-01-15 RX ORDER — BENZONATATE 100 MG/1
CAPSULE ORAL
COMMUNITY
Start: 2019-12-13

## 2020-01-15 RX ORDER — RIVASTIGMINE 13.3 MG/24H
PATCH, EXTENDED RELEASE TRANSDERMAL
COMMUNITY
Start: 2018-09-19

## 2020-01-15 RX ORDER — MONTELUKAST SODIUM 4 MG/1
TABLET, CHEWABLE ORAL
COMMUNITY

## 2020-01-15 RX ORDER — SPIRONOLACTONE 25 MG/1
TABLET ORAL
COMMUNITY

## 2020-01-15 RX ORDER — FERROUS SULFATE 325(65) MG
325 TABLET, DELAYED RELEASE (ENTERIC COATED) ORAL DAILY
COMMUNITY
Start: 2019-11-15

## 2020-01-15 RX ORDER — HYDROCORTISONE ACETATE 25 MG/1
SUPPOSITORY RECTAL
COMMUNITY
Start: 2019-03-04

## 2020-01-15 RX ORDER — GABAPENTIN 300 MG/1
CAPSULE ORAL
COMMUNITY

## 2020-01-15 RX ORDER — FAMOTIDINE 40 MG/5ML
POWDER, FOR SUSPENSION ORAL
COMMUNITY

## 2020-01-15 NOTE — TELEPHONE ENCOUNTER
----- Message from Shala Hernandez MD sent at 1/11/2020  3:42 PM CST -----  Decrease csa to 100 mg q am from 125 and decrease pm dose to 75 mg from 100 mg; repeat labs in 2 weeks - please let patient know.

## 2020-01-15 NOTE — TELEPHONE ENCOUNTER
----- Message from Chelsey Hurtado sent at 1/15/2020  2:16 PM CST -----  Pt is returning phone call to coordinator pt stated that they have missed a call      Pt contact [phone]

## 2020-01-15 NOTE — PATIENT INSTRUCTIONS
Ms. Stout's anal pain is likely due to both her hemorrhoids and skin irritation from leaking stool.  - Please do not use any wipes or creams with witch hazel as this has alcohol and can dry out the skin  - May try calmoseptine ointment instead  - A Bidet toilet seat extension may help her to evacuate completely and clean her as well.  - Also recommend metamucil (psyllium) 2x/day and a high fiber diet  - Follow up in 6 weeks. If her symptoms improve, may send a message through the portal to update Dr. Villar      Psyllium granules or powder for solution  What is this medicine?  PSYLLIUM (ESTELLA i yum) is a bulk-forming fiber laxative. This medicine is used to treat constipation. Increasing fiber in the diet may also help lower cholesterol and promote heart health for some people.  How should I use this medicine?  Mix this medicine into a full glass of water or other drink. Take by mouth. Follow the directions on the package labeling, or take as directed by your health care professional. Mix this medicine with 8 ounces or more of fluid. If the drink thickens add more fluid before drinking. Take your medicine at regular intervals. Do not take your medicine more often than directed.  Talk to your pediatrician regarding the use of this medicine in children. While this drug may be prescribed for children as young as 6 years old for selected conditions, precautions do apply.  What side effects may I notice from receiving this medicine?  Side effects that you should report to your doctor or health care professional as soon as possible:  · allergic reactions like skin rash, itching or hives, swelling of the face, lips, or tongue  · breathing problems  · chest pain  · nausea, vomiting  · rectal bleeding  · trouble swallowing  Side effects that usually do not require medical attention (report to your doctor or health care professional if they continue or are bothersome):  · bloated or 'gassy'  feeling  · diarrhea  · headache  · stomach cramps  What may interact with this medicine?  Interactions are not expected.  What if I miss a dose?  If you miss a dose, take it as soon as you can. If it is almost time for your next dose, take only that dose. Do not take double or extra doses.  Where should I keep my medicine?  Keep out of the reach of children.  Store at room temperature between 15 and 30 degrees C (59 and 86 degrees F). Protect from moisture. Throw away any unused medicine after the expiration date.  What should I tell my health care provider before I take this medicine?  They need to know if you have any of these conditions:  · change in bowel habits for more than 14 days  · blocked intestines or bowel  · phenylketonuria  · stomach pain, nausea, or vomiting  · trouble swallowing  · an unusual or allergic reaction to psyllium, tartrazine dye, other medicines, dyes, or preservatives  · pregnant or trying or get pregnant  · breast-feeding  What should I watch for while using this medicine?  This medicine can take up to 3 days to work. Check with your doctor or health care professional if your symptoms do not start to get better or if they get worse. See your doctor if you have to treat your constipation for more than 1 week.  Avoid taking other medicines within 2 hours of taking this medicine.  Drink several glasses of water a day while you are taking this medicine. This will help to relieve constipation and prevent dehydration.  Avoid breathing in this medicine. This can cause an allergic reaction or make it difficult to breathe.  NOTE:This sheet is a summary. It may not cover all possible information. If you have questions about this medicine, talk to your doctor, pharmacist, or health care provider. Copyright© 2017 [employer]                Eating a High-Fiber Diet  Fiber is what gives strength and structure to plants. Most grains, beans, vegetables, and fruits contain fiber. Foods rich in fiber  are often low in calories and fat, and they fill you up more. They may also reduce your risks for certain health problems. To find out the amount of fiber in canned, packaged, or frozen foods, read the Nutrition Facts label. It tells you how much fiber is in a serving.    Types of fiber and their benefits  There are two types of fiber: insoluble and soluble. They both aid digestion and help you maintain a healthy weight.  · Insoluble fiber. This is found in whole grains, cereals, certain fruits and vegetables such as apple skin, corn, and carrots. Insoluble fiber may prevent constipation and reduce the risk for certain types of cancer.  · Soluble fiber. This type of fiber is in oats, beans, and certain fruits and vegetables such as strawberries and peas. Soluble fiber can reduce cholesterol, which may help lower the risk for heart disease. It also helps control blood sugar levels.  Look for high-fiber foods  Try these foods to add fiber to your diet:  · Whole-grain breads and cereals. Try to eat 6 to 8 ounces a day. Include wheat and oat bran cereals, whole-wheat muffins or toast, and corn tortillas in your meals.  · Fruits. Try to eat 2 cups a day. Apples, oranges, strawberries, pears, and bananas are good sources. (Note: Fruit juice is low in fiber.)  · Vegetables. Try to eat at least 2.5 cups a day. Add asparagus, carrots, broccoli, peas, and corn to your meals.  · Beans. One cup of cooked lentils gives you over 15 grams of fiber. Try navy beans, lentils, and chickpeas.  · Seeds. A small handful of seeds gives you about 3 grams of fiber. Try sunflower seeds.  Keep track of your fiber  Keep track of how much fiber you eat. Start by reading food labels. Then eat a variety of foods high in fiber. As you begin to eat more fiber, ask your healthcare provider how much water you should be drinking to keep your digestive system working smoothly.  You should aim for a certain amount of fiber in your diet each day. If you  are a woman, that amount is between 25 and 28 grams per day. Men should aim for 30 to 33 grams per day. After age 50, your daily fiber needs drop to 22 grams for women and 28 grams for men.  Before you reach for the fiber supplements, think about this. Fiber is found naturally in healthy whole foods. It gives you that feeling of fullness after you eat. Taking fiber supplements or eating fiber-enriched foods will not give you this full feeling.  Your fiber intake is a good measure for the quality of your overall diet. If you are missing out on your daily amount of fiber, you may be lacking other important nutrients as well.  Date Last Reviewed: 5/11/2015  © 8044-5982 [employer]. [address] [place] [address], [place] [address]. All rights reserved. This information is not intended as a substitute for professional medical care. Always follow your healthcare professional's instructions.

## 2020-01-15 NOTE — TELEPHONE ENCOUNTER
Called Mr Stout on his cell and home number to review stable lab and cyclo decrease to 100/75mg. Got VM on both and left detailed msgs on both. Recheck labs on 1/27/20. Lab order faxed to [place] [facility].

## 2020-01-15 NOTE — PROGRESS NOTES
"[facility]  Colorectal Surgery  Progress Note      Subjective:     Lillian Stout is a 74 y.o. 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 Of note, she has dementia and is somewhat of a poor historian. Her  assists in giving the history. He reports that for the last 2 years, she has been having issues with hemorrhoids. These are very uncomfortable for her particularly with wiping and her  occasionally notes blood on the toilet paper. This has been exacerbated by a recent episode of C. Diff for which she recently finished antibiotics in the previous week. Her  reports that she still has some loose stool and so is concerned that she may still have c. Diff. Her  is her primary care taker and helps her in the bathroom. He has been using "hemorrhoid wipes" which he feels help and Mrs. Stout has been applying vasoline which she feels is helping as well. They have not tried measures such as metamucil or sitz baths. However, they saw Dr. Julian Womack who has performed 3 rubber band ligations in the past year, but she continues to have issues with her hemorrhoids.     Last colonoscopy: 5/23/2015 - congested mucosa in the rectum and sigmoid colon. Otherwise unremarkable.   - Reportedly had one in [place] in 2016 which was also normal  Family history of CRC: denies  Family history of IBD: denies    ROS:  Gen: no fevers, no chills, no recent weight loss  CV: no palpitations, no peripheral edema  Respit: no cough, no SOB  Abd: no abd pain, no nausea, no vomiting. Loose stools, painful bowel movements, occasionally bloody bowel movements  Skin: no rash, no wound  MSK: no back pain, no arthralgias, no myalgias  Heme: no lymphadenopathy  Neuro: no headache, no dizziness      Current Outpatient Medications:     acetaminophen (TYLENOL) 325 MG tablet, Take 325 " "mg by mouth every 6 (six) hours as needed for Pain., Disp: , Rfl:     alendronate (FOSAMAX) 70 MG tablet, Take 70 mg by mouth every 7 days., Disp: , Rfl: 14    ascorbic acid (VITAMIN C) 500 MG tablet, Take 1 tablet (500 mg total) by mouth once daily., Disp: , Rfl:     aspirin 81 MG Chew, Take 81 mg by mouth once daily., Disp: , Rfl:     BREEZE 2 TEST STRIPS Strp, , Disp: , Rfl:     CAPSAICIN TOP, Apply topically., Disp: , Rfl:     colestipol (COLESTID) 1 gram Tab, Take 1 g by mouth once daily., Disp: , Rfl: 1    cycloSPORINE modified, NEORAL, (NEORAL) 100 MG capsule, TAKE 1 CAPSULE TWICE DAILY (PATIENT'S TOTAL DOSE 125  MG IN THE MORNING   MG IN THE EVENING), Disp: 180 capsule, Rfl: 3    cycloSPORINE modified, NEORAL, (NEORAL) 25 MG capsule, TAKE 1 CAPSULE EVERY       MORNING (TOTAL DOSE 125MG  IN THE MORNING AND 100MG INTHE EVENING), Disp: 90 capsule, Rfl: 3    diazePAM (VALIUM) 5 MG tablet, Take 2.5 mg by mouth nightly as needed for Anxiety., Disp: , Rfl:     diltiazem HCl (DILTIAZEM 2% CREAM), Apply topically once daily. Apply topically to anal area., Disp: , Rfl:     famotidine (PEPCID) 40 MG tablet, TAKE 1 TABLET (40 MG TOTAL) BY MOUTH ONCE DAILY., Disp: 90 tablet, Rfl: 3    furosemide (LASIX) 20 MG tablet, TAKE 1 TABLET (20 MG TOTAL) BY MOUTH ONCE DAILY. (Patient taking differently: Take 20 mg by mouth 2 (two) times daily. ), Disp: 90 tablet, Rfl: 0    gabapentin (NEURONTIN) 300 MG capsule, Take 300 mg by mouth once daily. , Disp: , Rfl: 5    insulin detemir (LEVEMIR) 100 unit/mL injection, Inject 30 Units into the skin once daily. , Disp: , Rfl:     insulin lispro (HUMALOG) 100 unit/mL injection, Inject 12 Units into the skin 3 (three) times daily before meals., Disp: 10 mL, Rfl: 0    insulin needles, disposable, 31 X 5/16 " Ndle, Use as directed, Disp: 100 each, Rfl: 0    loperamide (IMODIUM) 2 mg capsule, Take 1 capsule (2 mg total) by mouth 4 (four) times daily as needed for " Diarrhea., Disp: 90 capsule, Rfl: 0    memantine (NAMENDA) 10 MG Tab, Take 5 mg by mouth 2 (two) times daily., Disp: , Rfl:     predniSONE (DELTASONE) 5 MG tablet, TAKE 1 TABLET BY MOUTH EVERY DAY, Disp: 90 tablet, Rfl: 0    rivastigmine (EXELON) 9.5 mg/24 hr PT24, Place 13.3 patches onto the skin once daily., Disp: , Rfl:     spironolactone (ALDACTONE) 25 MG tablet, Take 12.5 mg by mouth 2 (two) times daily., Disp: , Rfl:     vitamin D 1000 units Tab, Take 2 tablets (2,000 Units total) by mouth once daily., Disp: , Rfl:     Review of patient's allergies indicates:  No Known Allergies    Past Medical History:   Diagnosis Date    Arthritis     Ascites     CAD (coronary artery disease)     stented in the 90's    Chronic back pain     Chronic immunosuppression with Tacrolimus and MMF 12/29/2014    Cirrhosis     Dementia without behavioral disturbance 10/14/2019    Diabetes     Diarrhea 4/4/2016    DVT (deep venous thrombosis) 3/16/2015    S/p IVC filter    Esophageal varices     Gastritis     GERD (gastroesophageal reflux disease)     Hepatic encephalopathy     History of colonic polyps     HTN (hypertension)     Intraventricular hemorrhage     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 10/2/2014    Liver transplanted     NDIAYE (nonalcoholic steatohepatitis)     Neuropathy     Obesity     S/P PTCA with Stents x 3 November 2011    Thrombocytopenia     Wheelchair dependence     for long distances     Past Surgical History:   Procedure Laterality Date    CHOLECYSTECTOMY      COLONOSCOPY      hx polyps    COLONOSCOPY W/ POLYPECTOMY      CORONARY STENT PLACEMENT      ERCP W/ PLASTIC STENT PLACEMENT      ESOPHAGOGASTRODUODENOSCOPY      ESOPHAGOGASTRODUODENOSCOPY W/ BANDING      SHANICE FILTER PLACEMENT      HYSTERECTOMY      KIDNEY TRANSPLANT      LIVER BIOPSY  2/13/2013    LIVER TRANSPLANT       Family History     Problem Relation (Age of Onset)    Diabetes Mother    Heart disease Brother     Hypertension Father    Parkinsonism Maternal Aunt    Stroke Mother        Tobacco Use    Smoking status: Never Smoker    Smokeless tobacco: Never Used   Substance and Sexual Activity    Alcohol use: No    Drug use: No    Sexual activity: Not on file       Objective:     Vitals:   There were no vitals filed for this visit.    Physical Exam:  Gen: well appearing, no acute distress  CV: RRR  Respit: Breathing non-labored  Abd: Soft, non-tender, non-distended  Rectal: Soft stool palpated in the rectal vault. Very tender on examination. No gross blood.  Inspection:    External hemorrhoids and prolapsed right posterior hemorrhoid column. Feculent material noted on the skin around the anus   Anoscopy revealed very loose stool in the rectal vault   Extr: Warm and well perfused  MSK: moves all extremities appropriately  Neuro: alert, CN II - XII grossly intact, poor historian    Significant Diagnostics:  Reviewed    Assessment/Plan:     Lillian Stout is a 74 y.o. female with a history of liver-kidney transplant with external and internal hemorrhoids. While hemorrhoids were noted on exam, it seems she is not evacuating completely and so has fecal material which leaks onto her skin causing irritation and sensitivity.    - Discontinue using any wipes or creams with witch hazel as this has alcohol and can dry out the skin  - Try Calmoseptine ointment instead  - Recommended a bidet toilet seat extension to help her clean with evacuation.  - Also recommended metamucil (psyllium) 2x/day and a high fiber diet  - Follow up in 6 weeks    Marlene Noriega MD  General Surgery, PGY-1  [phone]     I have personally obtained a history and performed a physical exam with and independent to my resident and discussed the findings and plan with the patient.  I agree with the above findings and plan with the following exceptions:  None    H, Jamil Villar MD, FACS, FASCRS  Staff Surgeon  Dept of Colon and Rectal  Surgery  [facility] [place], [place]    Anoscopy Procedure Note:   Verbal consent obtained    Indications:  H/o anal burning    Post procedure diagnosis:  Incomplete evacuation    Procedure: anoscopy    Surgeon AFSHAN    Assistant: Hari Nava none     Findings:  Residual stool in vault    Right posterior hemorrhoid grade 1  Right anterior hemorrhoid grade 1  Left lateral hemorrhoid grade 1    Pt tolerated procedure well.      No complications.

## 2020-01-17 RX ORDER — CYCLOSPORINE 100 MG/1
100 CAPSULE, GELATIN COATED ORAL EVERY MORNING
Qty: 30 CAPSULE | Refills: 11 | Status: SHIPPED | OUTPATIENT
Start: 2020-01-17 | End: 2020-01-20 | Stop reason: DRUGHIGH

## 2020-01-17 RX ORDER — CYCLOSPORINE 25 MG/1
75 CAPSULE, LIQUID FILLED ORAL NIGHTLY
Qty: 90 CAPSULE | Refills: 11 | Status: SHIPPED | OUTPATIENT
Start: 2020-01-17 | End: 2020-01-20 | Stop reason: DRUGHIGH

## 2020-01-20 ENCOUNTER — PATIENT MESSAGE (OUTPATIENT)
Dept: TRANSPLANT | Facility: CLINIC | Age: 75
End: 2020-01-20

## 2020-01-20 DIAGNOSIS — Z94.4 STATUS POST LIVER TRANSPLANT: ICD-10-CM

## 2020-01-22 RX ORDER — CYCLOSPORINE 25 MG/1
75 CAPSULE, LIQUID FILLED ORAL NIGHTLY
Qty: 90 CAPSULE | Refills: 11 | Status: SHIPPED | OUTPATIENT
Start: 2020-01-22 | End: 2020-02-07

## 2020-01-22 RX ORDER — CYCLOSPORINE 100 MG/1
100 CAPSULE, GELATIN COATED ORAL EVERY MORNING
Qty: 30 CAPSULE | Refills: 11 | Status: SHIPPED | OUTPATIENT
Start: 2020-01-22 | End: 2020-02-07 | Stop reason: DRUGHIGH

## 2020-01-30 LAB
CYCLOSPORINE: 236
EXT ALBUMIN: 3.5
EXT ALKALINE PHOSPHATASE: 95
EXT ALT: 10
EXT AST: 15
EXT BASOPHIL%: 0.1
EXT BILIRUBIN TOTAL: 0.6
EXT BUN: 22
EXT CALCIUM: 9.2
EXT CHLORIDE: 102
EXT CO2: 26
EXT CREATININE: 1.53 MG/DL
EXT EOSINOPHIL%: 1
EXT GLUCOSE: 135
EXT HEMATOCRIT: 40.2
EXT HEMOGLOBIN: 12
EXT LYMPH%: 10.9
EXT MONOCYTES%: 6.7
EXT PLATELETS: 216
EXT POTASSIUM: 5
EXT PROTEIN TOTAL: 6
EXT SEGS%: 80.2
EXT SODIUM: 142 MMOL/L
EXT WBC: 13.9

## 2020-01-31 ENCOUNTER — TELEPHONE (OUTPATIENT)
Dept: TRANSPLANT | Facility: CLINIC | Age: 75
End: 2020-01-31

## 2020-01-31 NOTE — TELEPHONE ENCOUNTER
----- Message from Raul Butler MD sent at 1/30/2020  3:28 PM CST -----  Results reviewed  Creatinine is rising and I don't see a tac level- do we have any levels?

## 2020-02-07 ENCOUNTER — PATIENT MESSAGE (OUTPATIENT)
Dept: TRANSPLANT | Facility: CLINIC | Age: 75
End: 2020-02-07

## 2020-02-07 DIAGNOSIS — Z94.4 STATUS POST LIVER TRANSPLANT: ICD-10-CM

## 2020-02-07 RX ORDER — CYCLOSPORINE 25 MG/1
75 CAPSULE, LIQUID FILLED ORAL 2 TIMES DAILY
Qty: 180 CAPSULE | Refills: 11 | Status: SHIPPED | OUTPATIENT
Start: 2020-02-07 | End: 2020-02-21

## 2020-02-07 NOTE — TELEPHONE ENCOUNTER
----- Message from Raul Butler MD sent at 2/6/2020  2:16 PM CST -----  Results reviewed  Reduce cyclo to 75 mg BID   12T OT note: Received OT orders, awaiting PT triage under observation status. Patient was not available for their therapy session at this time.  Reason not seen: Other (comment)(awaiting PT triage under observation) (11/15/18 1156).    Re-Attempt Plan: Will re-attempt later today (11/15/18 1156).

## 2020-02-07 NOTE — TELEPHONE ENCOUNTER
Nilda Peters and Mrs Stout,    Dr Butler reviewed labs and they are stable. The Cyclosporine level was elevated. Please adjust cyclosporine to 75mg twice daily. Repeat labs next week. I have faxed ordered to [facility].    Thanks,    CARLA Vilchis

## 2020-02-13 LAB
EXT ALBUMIN: 3.4
EXT ALKALINE PHOSPHATASE: 92
EXT ALT: 13
EXT AST: 20
EXT BASOPHIL%: 0.3
EXT BILIRUBIN TOTAL: 0.7
EXT BUN: 27
EXT CALCIUM: 9
EXT CHLORIDE: 102
EXT CO2: 25
EXT CREATININE: 1.72 MG/DL
EXT CYCLOSPORONE LEVEL: 204
EXT EOSINOPHIL%: 1.4
EXT GLUCOSE: 152
EXT HEMATOCRIT: 40.3
EXT HEMOGLOBIN: 12.1
EXT LYMPH%: 11
EXT MONOCYTES%: 5.2
EXT PLATELETS: 215
EXT POTASSIUM: 5
EXT PROTEIN TOTAL: 5.8
EXT SEGS%: 80.8
EXT SODIUM: 142 MMOL/L
EXT WBC: 15.1

## 2020-02-20 ENCOUNTER — TELEPHONE (OUTPATIENT)
Dept: TRANSPLANT | Facility: CLINIC | Age: 75
End: 2020-02-20

## 2020-02-20 NOTE — TELEPHONE ENCOUNTER
----- Message from Raul Butler MD sent at 2/14/2020  4:25 PM CST -----  Results reviewed  Nephrology and need to see if we can reduce her cyclo

## 2020-02-20 NOTE — TELEPHONE ENCOUNTER
----- Message from Raul Butler MD sent at 2/20/2020 12:22 PM CST -----  Results reviewed  Repeat labs in 2 weeks to see if we can reduce cyclo more

## 2020-02-21 DIAGNOSIS — Z94.4 STATUS POST LIVER TRANSPLANT: ICD-10-CM

## 2020-02-21 RX ORDER — CYCLOSPORINE 25 MG/1
CAPSULE, LIQUID FILLED ORAL
Qty: 150 CAPSULE | Refills: 11 | Status: SHIPPED | OUTPATIENT
Start: 2020-02-21 | End: 2020-06-18

## 2020-02-21 NOTE — TELEPHONE ENCOUNTER
Called Mr Stout and reviewed labs and med adjustment. Labs next week with [facility]. Will call them on Monday am to set up. He states he understands.

## 2020-02-21 NOTE — TELEPHONE ENCOUNTER
----- Message from Raul Butler MD sent at 2/20/2020  2:58 PM CST -----  75 + 50   ----- Message -----  From: Mame Serrano RN  Sent: 2/20/2020   2:33 PM CST  To: Raul Butler MD    Reduce to what dose? She is currently on 75mg bid.    Mame  ----- Message -----  From: Raul Butler MD  Sent: 2/14/2020   4:25 PM CST  To: [facility] Clinical    Results reviewed  Nephrology and need to see if we can reduce her cyclo

## 2020-02-26 ENCOUNTER — PATIENT MESSAGE (OUTPATIENT)
Dept: SURGERY | Facility: CLINIC | Age: 75
End: 2020-02-26

## 2020-03-06 ENCOUNTER — PATIENT MESSAGE (OUTPATIENT)
Dept: TRANSPLANT | Facility: CLINIC | Age: 75
End: 2020-03-06

## 2020-03-09 LAB
EXT ALBUMIN: 3.7
EXT ALKALINE PHOSPHATASE: 93
EXT ALT: 13
EXT AST: 18
EXT BASOPHIL%: 0.3
EXT BILIRUBIN TOTAL: 0.4
EXT BUN: 22
EXT CALCIUM: 9.2
EXT CHLORIDE: 104
EXT CO2: 25
EXT CREATININE: 1.4 MG/DL
EXT CYCLOSPORONE LEVEL: 135.9
EXT EOSINOPHIL%: 1.3
EXT GLUCOSE: 212
EXT HEMATOCRIT: 39.6
EXT HEMOGLOBIN: 11.7
EXT LYMPH%: 12.3
EXT MONOCYTES%: 6.8
EXT PLATELETS: 172
EXT POTASSIUM: 5.1
EXT PROTEIN TOTAL: 5.8
EXT SEGS%: 78.9
EXT SODIUM: 141 MMOL/L
EXT WBC: 12

## 2020-03-10 ENCOUNTER — TELEPHONE (OUTPATIENT)
Dept: TRANSPLANT | Facility: CLINIC | Age: 75
End: 2020-03-10

## 2020-03-10 NOTE — LETTER
March 10, 2020    Lillian Stout  [address] [facility] [address]          Dear Lillian Stout:  MRN: [id number]    This is a follow up to your recent labs, your lab results were stable.  There are no medicine changes.  Please have your labs drawn again on 6/8/2020.      If you cannot have your labs drawn on the scheduled date, it is your responsibility to call the transplant department to reschedule.  To reschedule or make an appointment, please as to speak to or leave a message for my assistant, Kenia Fitzgerald or Mame, at [phone].  When leaving a message for Kenia Fitzgerald Angela or myself, we ask that you leave a brief message regarding your request.    Sincerely,      Mame Serrano, RN ,BSN ,CCTC  Your Liver Transplant Coordinator    [facility]  [facility] [place] [address]  [phone]

## 2020-03-10 NOTE — TELEPHONE ENCOUNTER
----- Message from Barber Maynard MD sent at 3/9/2020  3:12 PM CDT -----  Results ok. No action needed

## 2020-03-10 NOTE — LETTER
March 10, 2020    Lillian Stout  [address] [facility] [address]          Dear Lillian Stout:  MRN: [id number]    This is a follow up to your recent labs, your lab results were stable.  There are no medicine changes.  Please have your labs drawn again on 6/8/2020.      If you cannot have your labs drawn on the scheduled date, it is your responsibility to call the transplant department to reschedule.  To reschedule or make an appointment, please as to speak to or leave a message for my assistant, Kenia Fitzgerald or Mame, at [phone].  When leaving a message for Kenia Fitzgerald Angela or myself, we ask that you leave a brief message regarding your request.    Sincerely,      Mame Serrano, RN, BSN, CCTC  Your Liver Transplant Coordinator    [facility]  [facility] [place] [address]  [phone]

## 2020-03-29 ENCOUNTER — PATIENT MESSAGE (OUTPATIENT)
Dept: TRANSPLANT | Facility: CLINIC | Age: 75
End: 2020-03-29

## 2020-03-30 DIAGNOSIS — Z94.4 STATUS POST LIVER TRANSPLANT: ICD-10-CM

## 2020-03-30 RX ORDER — PREDNISONE 5 MG/1
5 TABLET ORAL DAILY
Qty: 90 TABLET | Refills: 4 | Status: SHIPPED | OUTPATIENT
Start: 2020-03-30 | End: 2021-01-01

## 2020-05-12 ENCOUNTER — TELEPHONE (OUTPATIENT)
Dept: TRANSPLANT | Facility: CLINIC | Age: 75
End: 2020-05-12

## 2020-05-12 NOTE — TELEPHONE ENCOUNTER
Returned all to Dr Womack, colo-rectal surgeon in [place]. He had some questions that he wanted to run pst her Hepatologist. Will rely message and have Dr Butler return his call.

## 2020-05-12 NOTE — TELEPHONE ENCOUNTER
----- Message from Vicky Graham RN sent at 5/12/2020  3:48 PM CDT -----  Contact: lizett whaley      ----- Message -----  From: Ethan Peralta  Sent: 5/12/2020   3:44 PM CDT  To: [facility] Last Staff    Calling to speak with  Regarding pt    Call back: [phone] (cell)

## 2020-05-14 ENCOUNTER — TELEPHONE (OUTPATIENT)
Dept: TRANSPLANT | Facility: CLINIC | Age: 75
End: 2020-05-14

## 2020-05-14 NOTE — TELEPHONE ENCOUNTER
Spoke with Colorectal surgeon Dr Fab Manzo and discussed the possibility of hemorrhoidectomy.  Pt had C diff after last treatment with antibiotics.    - suggested no antibiotic use (as per usual practice)  - he will discuss with patient and family

## 2020-05-14 NOTE — TELEPHONE ENCOUNTER
Spoke with Colorectal surgeon Dr Fab Manzo and discussed the possibility of hemorrhoidectomy.  Pt had C diff after last treatment with antibiotics.     - suggested no antibiotic use (as per usual practice)  - he will discuss with patient and family-Per Dr Butler

## 2020-05-21 ENCOUNTER — PATIENT MESSAGE (OUTPATIENT)
Dept: TRANSPLANT | Facility: CLINIC | Age: 75
End: 2020-05-21

## 2020-05-28 ENCOUNTER — TELEPHONE (OUTPATIENT)
Dept: TRANSPLANT | Facility: CLINIC | Age: 75
End: 2020-05-28

## 2020-05-28 NOTE — TELEPHONE ENCOUNTER
Called patient to check readiness for video visit. Per patient, ready for video visit. Epre-check completed. Patient informed that they can check in for appt up to 15 minutes prior to appt time. Verbalized understanding.

## 2020-05-29 ENCOUNTER — OFFICE VISIT (OUTPATIENT)
Dept: TRANSPLANT | Facility: CLINIC | Age: 75
End: 2020-05-29
Payer: MEDICARE

## 2020-05-29 DIAGNOSIS — Z94.0 S/P KIDNEY TRANSPLANT: Chronic | ICD-10-CM

## 2020-05-29 DIAGNOSIS — Z98.61 S/P PTCA (PERCUTANEOUS TRANSLUMINAL CORONARY ANGIOPLASTY): Chronic | ICD-10-CM

## 2020-05-29 DIAGNOSIS — Z94.4 S/P LIVER TRANSPLANT: Primary | Chronic | ICD-10-CM

## 2020-05-29 DIAGNOSIS — D84.9 IMMUNOSUPPRESSED STATUS: ICD-10-CM

## 2020-05-29 PROCEDURE — 99211 OFF/OP EST MAY X REQ PHY/QHP: CPT

## 2020-05-29 PROCEDURE — G0463 HOSPITAL OUTPT CLINIC VISIT: HCPCS

## 2020-05-29 PROCEDURE — 99213 PR OFFICE/OUTPT VISIT, EST, LEVL III, 20-29 MIN: ICD-10-PCS | Mod: 95,,, | Performed by: INTERNAL MEDICINE

## 2020-05-29 PROCEDURE — 99213 OFFICE O/P EST LOW 20 MIN: CPT | Mod: 95,,, | Performed by: INTERNAL MEDICINE

## 2020-05-29 NOTE — LETTER
May 29, 2020        Julian Santo  [address] [facility] [address]  Phone: [phone]  Fax: [phone]             [facility]  [address]  [facility] [address]  Phone: [phone]   Patient: Lillian Stout   MR Number: [id number]   YOB: 1945   Date of Visit: 5/29/2020       Dear Dr. Julian Santo    Thank you for referring Lillian Stout to me for evaluation. Attached you will find relevant portions of my assessment and plan of care.    If you have questions, please do not hesitate to call me. I look forward to following Lillian Stout along with you.    Sincerely,    Raul Butler MD    Enclosure    If you would like to receive this communication electronically, please contact [email or web address] or [phone] to request [facility] Link access.    [facility] is a tool which provides read-only access to select patient information with whom you have a relationship. Its easy to use and provides real time access to review your patients record including encounter summaries, notes, results, and demographic information.    If you feel you have received this communication in error or would no longer like to receive these types of communications, please e-mail [email or web address]

## 2020-05-29 NOTE — PROGRESS NOTES
Subjective:       Patient ID: Lillian Stout is a 74 y.o. female.    Chief Complaint: Liver Transplant Follow-up  The patient location is: Home  The chief complaint leading to consultation is: Liver Tx    Visit type: audiovisual    Face to Face time with patient: 20 minutes of total time spent on the encounter, which includes face to face time and non-face to face time preparing to see the patient (eg, review of tests), Obtaining and/or reviewing separately obtained history, Documenting clinical information in the electronic or other health record, Independently interpreting results (not separately reported) and communicating results to the patient/family /caregiver, or Care coordination (not separately reported).     Each patient to whom he or she provides medical services by telemedicine is:  (1) informed of the relationship between the physician and patient and the respective role of any other health care provider with respect to management of the patient; and (2) notified that he or she may decline to receive medical services by telemedicine and may withdraw from such care at any time.    Notes:     HPI  I saw this 74 y.o. lady in her home via video visit. Most of the talking was done by her  who is her main caregiver.    Transplant Date: 2014 (Liver), 2014 (Kidney)  UNOS Native Liver Dx: Cirrhosis: Fatty Liver ([place]es)        ORGAN: LIVER  Whole or Partial: whole liver  Donor Type:  - brain death  CDC High Risk: no  Donor CMV Status: positive  Donor HCV Status: negative  Donor HBcAb: negative  Biliary Anastomosis: end to end  Arterial Anatomy: standard  IVC reconstruction: end to end ivc  Portal vein status: completely thrombosed     She had a long complicted course post liver-kidney transplant    Interval History: Lillian is now almost 6 years post liver-kidney transplant. Her weakness has not worsened but it has not improved, either. She needs assistance getting from sitting to  standing and with walking. Her  reports that her dementia is worsening.      She is still on CSA and prednisone (not cellcept).   She had her IVC filter removed on 2/3/16.     She continues to c/o fatigue and had a sleep study. CPAP was recommended. She is not using it at all anymore.     She is on rivastigmine (started aug 2018) and namenda for dementia.     She sees a dermatologist annually.  She also has regular mammograms and pap tests. Bone density was done in 2016 and showed osteoporosis. She is on alendronate.    She is quite frail and her  told me today that her main issue is hemorrhoids for which she may be getting surgery soon.    Problems with C diff + UTI  - a few months ago  - needed rehab- Oct 2019  Pressure ulcer on right heal    Cyclo 75/50 but currently taking 100/25 mg      Review of Systems   Constitutional: Negative for activity change, appetite change, chills, fatigue, fever and unexpected weight change.   HENT: Negative for ear pain, hearing loss, nosebleeds, sore throat and trouble swallowing.    Eyes: Negative for redness and visual disturbance.   Respiratory: Negative for cough, chest tightness, shortness of breath and wheezing.    Cardiovascular: Negative for chest pain and palpitations.   Gastrointestinal: Negative for abdominal distention, abdominal pain, blood in stool, constipation, diarrhea, nausea and vomiting.   Genitourinary: Negative for difficulty urinating, dysuria, frequency, hematuria and urgency.   Musculoskeletal: Negative for arthralgias, back pain, gait problem, joint swelling and myalgias.   Skin: Negative for rash.   Neurological: Negative for tremors, seizures, speech difficulty, weakness and headaches.   Hematological: Negative for adenopathy.   Psychiatric/Behavioral: Negative for confusion, decreased concentration and sleep disturbance. The patient is not nervous/anxious.            Lab Results   Component Value Date    ALT 21 11/13/2017    AST 28  11/13/2017    GGT 45 01/25/2016    ALKPHOS 114 11/13/2017    BILITOT 0.6 11/13/2017     Past Medical History:   Diagnosis Date    Arthritis     Ascites     CAD (coronary artery disease)     stented in the 90's    Chronic back pain     Chronic immunosuppression with Tacrolimus and MMF 12/29/2014    Cirrhosis     Dementia without behavioral disturbance 10/14/2019    Diabetes     Diarrhea 4/4/2016    DVT (deep venous thrombosis) 3/16/2015    S/p IVC filter    Esophageal varices     Gastritis     GERD (gastroesophageal reflux disease)     Hepatic encephalopathy     History of colonic polyps     HTN (hypertension)     Intraventricular hemorrhage     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 10/2/2014    Liver transplanted     NDIAYE (nonalcoholic steatohepatitis)     Neuropathy     Obesity     S/P PTCA with Stents x 3 November 2011    Thrombocytopenia     Wheelchair dependence     for long distances     Past Surgical History:   Procedure Laterality Date    CHOLECYSTECTOMY      COLONOSCOPY      hx polyps    COLONOSCOPY W/ POLYPECTOMY      CORONARY STENT PLACEMENT      ERCP W/ PLASTIC STENT PLACEMENT      ESOPHAGOGASTRODUODENOSCOPY      ESOPHAGOGASTRODUODENOSCOPY W/ BANDING      SHANICE FILTER PLACEMENT      HYSTERECTOMY      KIDNEY TRANSPLANT      LIVER BIOPSY  2/13/2013    LIVER TRANSPLANT       Current Outpatient Medications   Medication Sig    acetaminophen (TYLENOL) 325 MG tablet Take 325 mg by mouth every 6 (six) hours as needed for Pain.    alendronate (FOSAMAX) 70 MG tablet Take 70 mg by mouth every 7 days.    alendronate (FOSAMAX) 70 MG tablet TAKE 1 TABLET BY MOUTH ONCE A WEEK AS DIRECTED    amoxicillin (AMOXIL) 500 MG Tab 1 tablet    ascorbic acid (VITAMIN C) 500 MG tablet Take 1 tablet (500 mg total) by mouth once daily.    aspirin (ASPIR-81) 81 MG EC tablet 1 tablet    aspirin 81 MG Chew Take 81 mg by mouth once daily.    FERMIN ASPIRIN ORAL as directed    FERMIN ASPIRIN  ORAL CHECK BLOOD SUGAR BEFORE   MEALS AND AT BEDTIME AS    DIRECTED.    benzonatate (TESSALON) 100 MG capsule 1 capsule as needed    BREEZE 2 TEST STRIPS Strp     CAPSAICIN TOP Apply topically.    cholecalciferol, vitamin D3, (VITAMIN D3) 1,000 unit Chew 2  tablet    colestipol (COLESTID) 1 gram Tab Take 1 g by mouth once daily.    colestipol (COLESTID) 1 gram Tab 1 tablet    cyanocobalamin, vitamin B-12, (VITAMIN B-12) 2,500 mcg Subl 1 tablet    cycloSPORINE modified, NEORAL, (NEORAL) 25 MG capsule Take 3 capsules (75 mg total) by mouth every morning AND 2 capsules (50 mg total) every evening.    diazePAM (VALIUM) 5 MG tablet Take 2.5 mg by mouth nightly as needed for Anxiety.    diazePAM (VALIUM) 5 MG tablet 0.5    diltiazem HCl (DILTIAZEM 2% CREAM) Apply topically once daily. Apply topically to anal area.    famotidine (PEPCID) 40 MG tablet TAKE 1 TABLET (40 MG TOTAL) BY MOUTH ONCE DAILY.    famotidine (PEPCID) 40 mg/5 mL (8 mg/mL) suspension 1 tablet    ferrous sulfate 325 (65 FE) MG EC tablet 1 tablet    ferrous sulfate 325 (65 FE) MG EC tablet TAKE 1 TABLET BY MOUTH THREE TIMES A DAY    furosemide (LASIX) 20 MG tablet TAKE 1 TABLET (20 MG TOTAL) BY MOUTH ONCE DAILY. (Patient taking differently: Take 20 mg by mouth 2 (two) times daily. )    furosemide (LASIX) 20 MG tablet TAKE 2 TABLET (20 MG TOTAL) BY MOUTH ONCE DAILY.    gabapentin (NEURONTIN) 300 MG capsule Take 300 mg by mouth once daily.     gabapentin (NEURONTIN) 300 MG capsule 1 capsule at bedtime    hydrocortisone (ANUSOL-HC) 25 mg suppository 1 suppository    insulin detemir (LEVEMIR) 100 unit/mL injection Inject 30 Units into the skin once daily.     insulin lispro (HUMALOG BRITTANY KWIKPEN U-100) 100 unit/mL inph SS: -151 200 = 4 u; 201-250= 7 u; 251-300= 10u; 301-350= 12u ; 351-400= 15u; 401-450= 17u; >450= call    insulin lispro (HUMALOG) 100 unit/mL injection Inject 12 Units into the skin 3 (three) times daily before meals.  "   insulin needles, disposable, 31 X 5/16 " Ndle Use as directed    lidocaine (LIDODERM) 5 % 1 patch remove after 12 hours    loperamide (IMODIUM) 2 mg capsule Take 1 capsule (2 mg total) by mouth 4 (four) times daily as needed for Diarrhea.    memantine (NAMENDA) 10 MG Tab Take 5 mg by mouth 2 (two) times daily.    memantine (NAMENDA) 10 MG Tab 1 tablet    predniSONE (DELTASONE) 5 MG tablet 1 tablet with food or milk    predniSONE (DELTASONE) 5 MG tablet Take 1 tablet (5 mg total) by mouth once daily.    promethazine-codeine 6.25-10 mg/5 ml (PHENERGAN WITH CODEINE) 6.25-10 mg/5 mL syrup 5 ml as needed    rivastigmine (EXELON) 9.5 mg/24 hr PT24 Place 13.3 patches onto the skin once daily.    rivastigmine 13.3 mg/24 hour PT24 1 patch to skin    spironolactone (ALDACTONE) 25 MG tablet Take 12.5 mg by mouth 2 (two) times daily.    spironolactone (ALDACTONE) 25 MG tablet 1/2 tablet    vit c-ascorbate Ca-ascorb sod (VITAMIN C) 500 mg/15 mL Liqd 1 tablet    vitamin D 1000 units Tab Take 2 tablets (2,000 Units total) by mouth once daily.     No current facility-administered medications for this visit.        Objective:      Physical Exam    NOT DONE-VIDEO VISIT    Assessment:       1. 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 Kidney transplant 11/26/2014 for NDIAYE (combined liver-kidney)    3. S/P PCI with JENIFER x3 - 11/2011    4. Immunosuppressed status        Plan:   No medication changes.  Clinic in 1 year.    UNOS Patient Status  Functional Status: 60% - Requires occasional assistance but is able to care for needs  Physical Capacity: Limited Mobility  "

## 2020-06-12 LAB
EXT ALBUMIN: 4.1
EXT ALKALINE PHOSPHATASE: 103
EXT ALT: 15
EXT AST: 23
EXT BASOPHIL%: 0.3
EXT BILIRUBIN TOTAL: 0.6
EXT BUN: 22
EXT CALCIUM: 9.6
EXT CHLORIDE: 100
EXT CO2: 29
EXT CREATININE: 1.29 MG/DL
EXT CYCLOSPORONE LEVEL: 89.8
EXT EOSINOPHIL%: 2.5
EXT GLUCOSE: 175
EXT HEMATOCRIT: 42.1
EXT HEMOGLOBIN: 12.5
EXT LYMPH%: 15.6
EXT MONOCYTES%: 7
EXT PLATELETS: 168
EXT POTASSIUM: 4.5
EXT PROTEIN TOTAL: 6.4
EXT SODIUM: 142 MMOL/L
EXT WBC: 12.5

## 2020-06-15 ENCOUNTER — TELEPHONE (OUTPATIENT)
Dept: TRANSPLANT | Facility: CLINIC | Age: 75
End: 2020-06-15

## 2020-06-15 NOTE — TELEPHONE ENCOUNTER
----- Message from Brandyn Guerra MD sent at 6/13/2020  2:47 PM CDT -----  Increased cyclosporin to 75 mg twice daily.  Who primarily follows the patient?

## 2020-06-18 ENCOUNTER — TELEPHONE (OUTPATIENT)
Dept: TRANSPLANT | Facility: CLINIC | Age: 75
End: 2020-06-18

## 2020-06-18 DIAGNOSIS — Z94.4 STATUS POST LIVER TRANSPLANT: ICD-10-CM

## 2020-06-18 NOTE — TELEPHONE ENCOUNTER
----- Message from Brandyn Guerra MD sent at 6/15/2020 12:57 PM CDT -----  OK.  But still increase her cyclosporin to 75 mg twice daily and repeat her labs in 2 weeks.  ----- Message -----  From: Mame Serrano RN  Sent: 6/15/2020  10:22 AM CDT  To: MD Dr Charlie Arredondo,    Dr Hernandez keeps her cyclo between 75/125. Her level is 89.9. Would you still like me to adjust her dose?    Thanks,  Mame  ----- Message -----  From: Brandyn Guerra MD  Sent: 6/13/2020   2:47 PM CDT  To: [facility] Post-Liver Transplant Clinical    Increased cyclosporin to 75 mg twice daily.  Who primarily follows the patient?

## 2020-06-18 NOTE — TELEPHONE ENCOUNTER
Nilda Stout,    Dr Butler reviewed your labs and they are overall stable. Her cyclosporine level is low. He would like Mrs Snyder to increase cyclosporine dose to 75mg twice daily. Recheck labs in 2 weeks on 6/29. Does Mrs Snyder have Home health? If she does I can get them to draw it. Please let me know.    Thanks,  Mame

## 2020-06-19 RX ORDER — CYCLOSPORINE 25 MG/1
CAPSULE, LIQUID FILLED ORAL
Qty: 180 CAPSULE | Refills: 11 | OUTPATIENT
Start: 2020-06-19 | End: 2021-01-01

## 2020-07-07 ENCOUNTER — TELEPHONE (OUTPATIENT)
Dept: TRANSPLANT | Facility: CLINIC | Age: 75
End: 2020-07-07

## 2020-07-07 LAB
EXT ALBUMIN: 3.2
EXT ALKALINE PHOSPHATASE: 89
EXT ALT: 18
EXT AST: 37
EXT BACTERIA UA: ABNORMAL
EXT BASOPHIL%: 0.2
EXT BILIRUBIN TOTAL: 0.9
EXT BUN: 30
EXT CALCIUM: 8.4
EXT CHLORIDE: 108
EXT CO2: 17
EXT CREATININE: 1.56 MG/DL
EXT CYCLOSPORONE LEVEL: 84.9
EXT EOSINOPHIL%: 0.1
EXT GLUCOSE: 157
EXT HEMATOCRIT: 39
EXT HEMOGLOBIN: 11.7
EXT LYMPH%: 5.3
EXT MONOCYTES%: 9.4
EXT PLATELETS: 161
EXT POTASSIUM: 6.2
EXT PROTEIN TOTAL: 5.7
EXT PROTEIN UA: ABNORMAL
EXT RBC UA: ABNORMAL
EXT SEGS%: 23.4
EXT SODIUM: 140 MMOL/L
EXT WBC UA: ABNORMAL
EXT WBC: 28
LACTIC ACID: 3.3
LEUKOCYTE ESTERASE UR QL STRIP: ABNORMAL
PROCALCITONIN SERPL IA-MCNC: 23.4 NG/ML

## 2020-07-07 NOTE — TELEPHONE ENCOUNTER
Noted. Pt currently inpt at [facility] for sepsis. Will set up routine labs once pt is dcd from hospital.  ----- Message from Brandyn Guerra MD sent at 7/7/2020  1:32 PM CDT -----  Results reviewed.

## 2020-07-13 ENCOUNTER — TELEPHONE (OUTPATIENT)
Dept: TRANSPLANT | Facility: CLINIC | Age: 75
End: 2020-07-13

## 2020-07-13 NOTE — TELEPHONE ENCOUNTER
Lillian had a hemorrhoidectomy on 25th of June. On the 26th I could not wake her up. She was taken to ER and diagnosed with Sepsis and spent a few days in ICU. She was released on Jul 1 and sent home in an ambulance because she was not able to stand or be transferred to a wheel chair. She had been complaining about her right foot and leg hurting. I don't know if anyone checked that out.  I sent her back to ER on Jul 2 still complaining about leg and foot. And her stomach appeared swollen/extended. They performed a CT scan and found nothing. They performed a sonogram on her right leg and found some blockage. They then performed surgical procedure removing the blockage. She was released from the hospital on Jul [facility] be [facility] for two weeks. And she is not allowed any visitors. Hemorrhoid surgery is healing quite well. It's been a rough couple of weeks.

## 2020-11-05 ENCOUNTER — PATIENT MESSAGE (OUTPATIENT)
Dept: TRANSPLANT | Facility: CLINIC | Age: 75
End: 2020-11-05

## 2020-11-30 LAB
EXT ALBUMIN: 2.8
EXT ALKALINE PHOSPHATASE: 86
EXT ALT: 14
EXT AST: 20
EXT BILIRUBIN TOTAL: 0.4
EXT BUN: 19
EXT CALCIUM: 8.7
EXT CHLORIDE: 109
EXT CO2: 29.2
EXT CREATININE: 1 MG/DL
EXT CYCLOSPORONE LEVEL: 102
EXT EOSINOPHIL%: 2.4
EXT GLUCOSE: 165
EXT HEMATOCRIT: 39
EXT HEMOGLOBIN: 12.3
EXT LYMPH%: 14.2
EXT MONOCYTES%: 5.2
EXT PLATELETS: 168
EXT POTASSIUM: 4.8
EXT PROTEIN TOTAL: 5.6
EXT SEGS%: 77.4
EXT SODIUM: 149 MMOL/L
EXT WBC: 10.2

## 2020-12-07 ENCOUNTER — TELEPHONE (OUTPATIENT)
Dept: TRANSPLANT | Facility: CLINIC | Age: 75
End: 2020-12-07

## 2020-12-07 NOTE — TELEPHONE ENCOUNTER
Labs reviewed and are stable, continue q 3 months. Letter sent.     ----- Message from Shala Hernandez MD sent at 12/7/2020 12:09 PM CST -----  The Labs are stable - please let patient know.

## 2020-12-07 NOTE — LETTER
December 7, 2020    Lillian Rendonmicheleazalia  [address] [facility] [address]          Dear Lillian Stout:  MRN: [id number]    This is a follow up to your recent labs, your lab results were stable.  There are no medicine changes.  Please have your labs drawn again on 2/22/21.      If you cannot have your labs drawn on the scheduled date, it is your responsibility to call the transplant department to reschedule.  Please call [phone] and ask to speak to Mame BERTRAND -  for all scheduling requests.     Sincerely,        Your Liver Transplant Coordinator    [facility]  [facility] [place], [place] [id number] [phone]

## 2021-01-01 ENCOUNTER — PATIENT MESSAGE (OUTPATIENT)
Dept: TRANSPLANT | Facility: CLINIC | Age: 76
End: 2021-01-01

## 2021-01-01 ENCOUNTER — TELEPHONE (OUTPATIENT)
Dept: TRANSPLANT | Facility: CLINIC | Age: 76
End: 2021-01-01

## 2021-01-01 ENCOUNTER — TELEPHONE (OUTPATIENT)
Dept: TRANSPLANT | Facility: CLINIC | Age: 76
End: 2021-01-01
Payer: MEDICARE

## 2021-01-01 ENCOUNTER — OFFICE VISIT (OUTPATIENT)
Dept: TRANSPLANT | Facility: CLINIC | Age: 76
End: 2021-01-01
Payer: MEDICARE

## 2021-01-01 DIAGNOSIS — Z94.4 STATUS POST LIVER TRANSPLANT: ICD-10-CM

## 2021-01-01 DIAGNOSIS — Z94.0 S/P KIDNEY TRANSPLANT: Chronic | ICD-10-CM

## 2021-01-01 DIAGNOSIS — Z29.89 PROPHYLACTIC IMMUNOTHERAPY: Chronic | ICD-10-CM

## 2021-01-01 DIAGNOSIS — Z29.89 NEED FOR PROPHYLACTIC IMMUNOTHERAPY: ICD-10-CM

## 2021-01-01 DIAGNOSIS — I77.1 STENOSIS OF HEPATIC ARTERY OF TRANSPLANTED LIVER: ICD-10-CM

## 2021-01-01 DIAGNOSIS — R53.81 DEBILITY: ICD-10-CM

## 2021-01-01 DIAGNOSIS — Z94.4 S/P LIVER TRANSPLANT: Primary | Chronic | ICD-10-CM

## 2021-01-01 DIAGNOSIS — T86.49 STENOSIS OF HEPATIC ARTERY OF TRANSPLANTED LIVER: ICD-10-CM

## 2021-01-01 LAB
CREATININE RANDOM URINE: 79.6 MG/DL
CYCLOSPORINE, BLOOD: 115
EXT ALBUMIN: 2.2 (ref 3.4–5)
EXT ALBUMIN: 2.5
EXT ALBUMIN: 2.5
EXT ALKALINE PHOSPHATASE: 81
EXT ALKALINE PHOSPHATASE: 83
EXT ALKALINE PHOSPHATASE: 87 (ref 46–116)
EXT ALT: 18 (ref 14–59)
EXT ALT: 19
EXT ALT: 8
EXT AST: 20
EXT AST: 21 (ref 15–37)
EXT AST: 25
EXT BACTERIA UA: ABNORMAL
EXT BASOPHIL%: 0.3
EXT BASOPHIL%: 0.4 (ref 0–0.2)
EXT BILIRUBIN TOTAL: 0.3
EXT BILIRUBIN TOTAL: 0.3
EXT BILIRUBIN TOTAL: 0.4 (ref 0.2–1)
EXT BK VIRUS DNA QN PCR: NOT DETECTED
EXT BUN: 24
EXT BUN: 29
EXT BUN: 53 (ref 7–18)
EXT CALCIUM: 8.5
EXT CALCIUM: 8.7
EXT CALCIUM: 8.7 (ref 8.5–10.1)
EXT CHLORIDE: 105 (ref 98–107)
EXT CHLORIDE: 108
EXT CHLORIDE: 110
EXT CO2: 20
EXT CO2: 30 (ref 21–32)
EXT CO2: 31
EXT CREATININE: 1.2 MG/DL
EXT CREATININE: 1.29 MG/DL
EXT CREATININE: 1.4 MG/DL (ref 0.55–1.02)
EXT CYCLOSPORONE LEVEL: 114
EXT CYCLOSPORONE LEVEL: 180
EXT EOSINOPHIL%: 2.3 (ref 1–3)
EXT EOSINOPHIL%: 2.5
EXT EOSINOPHIL%: 3.1
EXT GFR MDRD NON AF AMER: 37
EXT GFR MDRD NON AF AMER: 44
EXT GFR MDRD NON AF AMER: 49
EXT GLUCOSE: 221
EXT GLUCOSE: 51
EXT GLUCOSE: 82 (ref 74–106)
EXT HEMATOCRIT: 31 (ref 33–45)
EXT HEMATOCRIT: 32
EXT HEMATOCRIT: 35
EXT HEMOGLOBIN: 11.4
EXT HEMOGLOBIN: 9.8
EXT HEMOGLOBIN: 9.9 (ref 12–15)
EXT LYMPH%: 11.3 (ref 18–42)
EXT LYMPH%: 11.5
EXT LYMPH%: 9
EXT MAGNESIUM: 1.8 (ref 1.8–2.4)
EXT MONOCYTES%: 4.8
EXT MONOCYTES%: 5.4 (ref 2–11)
EXT MONOCYTES%: 7.3
EXT PHOSPHORUS: 4 (ref 2.6–4.7)
EXT PLATELETS: 145
EXT PLATELETS: 146 (ref 150–450)
EXT PLATELETS: 161
EXT POTASSIUM: 4.1 (ref 3.5–5.1)
EXT POTASSIUM: 4.2
EXT POTASSIUM: 4.28
EXT PROTEIN TOTAL: 4.9
EXT PROTEIN TOTAL: 5
EXT PROTEIN TOTAL: 5.1 (ref 6.4–8.2)
EXT PROTEIN UA: NEGATIVE
EXT RBC UA: ABNORMAL
EXT SEGS%: 78
EXT SEGS%: 80.6 (ref 50–70)
EXT SEGS%: 81.8
EXT SODIUM: 141 MMOL/L (ref 136–145)
EXT SODIUM: 142 MMOL/L
EXT SODIUM: 146 MMOL/L
EXT TACROLIMUS LVL: <0.8
EXT WBC UA: ABNORMAL
EXT WBC: 10.3 (ref 4.6–10.2)
EXT WBC: 8.8
EXT WBC: 8.9 (ref 4.5–11.5)
PROTEIN URINE RANDOM: 8
PTH INTACT: 83 (ref 15–65)
RATIO: 101 MG/G
URINE CULTURE, ROUTINE: ABNORMAL

## 2021-01-01 PROCEDURE — 99213 OFFICE O/P EST LOW 20 MIN: CPT | Mod: 95,,, | Performed by: INTERNAL MEDICINE

## 2021-01-01 PROCEDURE — 99213 PR OFFICE/OUTPT VISIT, EST, LEVL III, 20-29 MIN: ICD-10-PCS | Mod: 95,,, | Performed by: INTERNAL MEDICINE

## 2021-01-01 RX ORDER — CYCLOSPORINE 100 MG/1
100 CAPSULE, LIQUID FILLED ORAL DAILY
Qty: 30 CAPSULE | Refills: 11 | Status: SHIPPED | OUTPATIENT
Start: 2021-01-01

## 2021-01-08 ENCOUNTER — PATIENT MESSAGE (OUTPATIENT)
Dept: TRANSPLANT | Facility: CLINIC | Age: 76
End: 2021-01-08

## 2022-01-01 ENCOUNTER — OFFICE VISIT (OUTPATIENT)
Dept: TRANSPLANT | Facility: CLINIC | Age: 77
End: 2022-01-01
Payer: MEDICARE

## 2022-01-01 ENCOUNTER — TELEPHONE (OUTPATIENT)
Dept: HEPATOLOGY | Facility: CLINIC | Age: 77
End: 2022-01-01
Payer: MEDICARE

## 2022-01-01 ENCOUNTER — TELEPHONE (OUTPATIENT)
Dept: TRANSPLANT | Facility: CLINIC | Age: 77
End: 2022-01-01
Payer: MEDICARE

## 2022-01-01 DIAGNOSIS — Z29.89 NEED FOR PROPHYLACTIC IMMUNOTHERAPY: ICD-10-CM

## 2022-01-01 DIAGNOSIS — Z94.4 LIVER TRANSPLANTED: ICD-10-CM

## 2022-01-01 DIAGNOSIS — Z29.89 PROPHYLACTIC IMMUNOTHERAPY: Chronic | ICD-10-CM

## 2022-01-01 DIAGNOSIS — D84.9 IMMUNOSUPPRESSED STATUS: ICD-10-CM

## 2022-01-01 DIAGNOSIS — Z94.4 S/P LIVER TRANSPLANT: Chronic | ICD-10-CM

## 2022-01-01 DIAGNOSIS — A04.72 C. DIFFICILE COLITIS: Primary | ICD-10-CM

## 2022-01-01 DIAGNOSIS — Z79.60 LONG-TERM USE OF IMMUNOSUPPRESSANT MEDICATION: ICD-10-CM

## 2022-01-01 PROCEDURE — 99214 PR OFFICE/OUTPT VISIT, EST, LEVL IV, 30-39 MIN: ICD-10-PCS | Mod: 95,,, | Performed by: INTERNAL MEDICINE

## 2022-01-01 PROCEDURE — 99214 OFFICE O/P EST MOD 30 MIN: CPT | Mod: 95,,, | Performed by: INTERNAL MEDICINE

## 2022-01-01 RX ORDER — CITALOPRAM 20 MG/1
20 TABLET, FILM COATED ORAL DAILY
COMMUNITY

## 2022-01-01 RX ORDER — METOPROLOL SUCCINATE 25 MG/1
25 TABLET, EXTENDED RELEASE ORAL DAILY
COMMUNITY

## 2022-01-01 RX ORDER — ALLOPURINOL 100 MG/1
100 TABLET ORAL DAILY
COMMUNITY

## 2022-01-24 NOTE — PATIENT INSTRUCTIONS
1. Will have your coordinator discuss with KTM re UTI  2. Consider urogynecology  3. Diarrhea despite imodium- need to rule out c diff  4. Has cough when lies down - consider switching pepcid to protonix  5. Get more up to date labs  Return 6 months

## 2022-01-24 NOTE — PROGRESS NOTES
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    ORGAN: LIVER  Whole or Partial: whole liver  Donor Type:  - brain death  CDC High Risk: no  Donor CMV Status: positive  Donor HCV Status: negative  Donor HBcAb: negative  Biliary Anastomosis: end to end  Arterial Anatomy: standard  IVC reconstruction: end to end ivc  Portal vein status: completely thrombosed    She had a long complicted course post liver-kidney transplant.     Subjective:     Interval History: Lillian is now almost 7 years post liver-kidney transplant. She no longer liver in a nursing home. She is living at home. Has been rehospitalized since the nursing home (22) for uti. She has weakness and dementia. Currently she is more somnolent and her home health nurse has questioned if she should return to the hospital. Her  states she has been having diarrhea despite imodium.     Allograft function 11/10/21: ALT 8, AST 20, ALKP 81, Tbil 0.3, creat 1.29  IS: CSA, prednisone; CSA level 115  Biliary stricture:  She had biliary stents removed in  and they were not replaced.  Abdo US with doppler: Oct/15 and was normal.  IVC filter: removed on 2/3/16.  Sleep apnea: CPAP was recommended. She is not using it at all anymore.  Dementia: remains on rivastigmine (started aug 2018) and namenda for dementia.  CAD: s/p PCI with JENIFER x 3-   Edema: has worsened- pt's  increased lasix to 120 mg daily; no longer on spironolactone        Health maintenance:  Dermatologist: sees annually.   Mammogram: needs mammograms and pap tests.  Bone density was done in 2016 and showed osteoporosis. She is on alendronate.  Colorectal CA screening: She has colonoscopies done locally.      Review of Systems   Constitutional: Negative.    HENT: Negative.    Eyes: Negative.     Respiratory: Negative.    Cardiovascular: Negative.    Gastrointestinal: Negative.    Genitourinary: Negative.    Skin: Negative.    Neurological: Negative.    Psychiatric/Behavioral: Negative.        Objective:         Lab Results   Component Value Date    BILITOT 0.6 11/13/2017    AST 28 11/13/2017    ALT 21 11/13/2017    ALKPHOS 114 11/13/2017    CREATININE 0.9 11/13/2017    ALBUMIN 3.1 (L) 11/13/2017     Lab Results   Component Value Date    WBC 14.98 (H) 11/13/2017    HGB 13.1 11/13/2017    HCT 41.1 11/13/2017    HCT 22 (L) 12/09/2014     11/13/2017     Lab Results   Component Value Date    TACROLIMUS <1.5 (L) 07/25/2015    CYCLOSPORINE 115 11/10/2021    CYCLOSPORINE 236 01/29/2020       Assessment/Plan:     The patient is now 7 years s/p liver- kidney transplant and has excellent allograft function. Current recommendations:  1. Complication of liver tx; biliary stricture: stents out: monitor for recurrence  2. S/p liver-kidney tx and control of IS: continue csa and prednisone;  (target trough ). Await next labs and level   3. DM, continue insulin  4. Malnutrition, improved: monitor  5. Zoster: no recurrence; monitor  6. Somnolence currently- agree needs to be seen in ER- r/o recurrent UTI vs c Diff since had recent abx  7. Diarrhea with incontinence despite metamucil and imodium -r/o c diff  9. Edema, ongoing: continue lasix  10. Health maintenance: the patient should see a dermatologist annually to screen for skin cancer, perform regular colonoscopies, pap tests and mammograms  11. R/O osteoporosis.I agree with alendronate; repeat bone density now  12. Hx CAD: f/u with cardiologist locally; especially in light of worsening edema  13. Cough when lying down: switch to PPI    Return 6 months    Shala Hernandez MD           [facility] Patient Status  Functional Status: 50% - Requires considerable assistance and frequent medical care  Physical Capacity: Limited Mobility  . . . . . . . . .

## 2022-01-24 NOTE — LETTER
January 24, 2022        Julian Santo  [address] [facility] [address]  Phone: [phone]  Fax: [phone]             [facility] - Liver Transplant  [address] [address]  Phone: [phone]  Fax: [phone]     Patient: Lillian Stout   MR Number: [id number]   YOB: 1945   Date of Visit: 1/24/2022       Dear Dr. Julian Santo    Thank you for referring Lillian Stout to me for evaluation. Attached you will find relevant portions of my assessment and plan of care.    If you have questions, please do not hesitate to call me. I look forward to following Lillian Stout along with you.    Sincerely,    Shala Hernandez MD    Enclosure    If you would like to receive this communication electronically, please contact [email or web address] or [phone] to request [facility] access.    [facility] Link is a tool which provides read-only access to select patient information with whom you have a relationship. Its easy to use and provides real time access to review your patients record including encounter summaries, notes, results, and demographic information.    If you feel you have received this communication in error or would no longer like to receive these types of communications, please e-mail [email or web address]

## 2022-01-25 NOTE — TELEPHONE ENCOUNTER
Writer reached out to patient to discuss below from Dr Hernandez. Patients  answered and stated patient went into the hospital last night due to feeling poorly with head cold symptoms, uti symptoms and diarrhea. Mr Stout states she was tested for COVID and was positive, he stated she also was tested and has a UTI, admitted primarily he said for the UTI as the COVID symptoms were not bad.     Mr Stout said he would ask the inpatient team to do stool testing to rule out infection.     Currently next scheduled labs as per Transplant protocol is 2/22/22.    Dr Hernandez's notes were faxed to PCP Dr Robin Hart (p) [phone] (f) [phone] and General Nephrologist Dr Camarillo (p) [phone] (f) [phone] to assist with below rec's      Shala Hernandez MD  [facility] Post-Liver Transplant Clinical  1. Will have your coordinator discuss with KTM re UTI   2. Consider urogynecology   3. Diarrhea despite imodium- need to rule out c diff   4. Has cough when lies down - consider switching pepcid to protonix   5. Get more up to date labs   Return 6 months

## 2022-02-01 ENCOUNTER — TELEPHONE (OUTPATIENT)
Dept: HEPATOLOGY | Facility: CLINIC | Age: 77
End: 2022-02-01
Payer: MEDICARE

## 2022-02-01 ENCOUNTER — PATIENT MESSAGE (OUTPATIENT)
Dept: TRANSPLANT | Facility: CLINIC | Age: 77
End: 2022-02-01
Payer: MEDICARE

## 2024-03-22 NOTE — TELEPHONE ENCOUNTER
Called pt's HH who was supposed to draw pt's labs. [facility]. Talked with Jocelyne, they are faxing over results now.  
16